# Patient Record
Sex: FEMALE | Race: WHITE | Employment: OTHER | URBAN - METROPOLITAN AREA
[De-identification: names, ages, dates, MRNs, and addresses within clinical notes are randomized per-mention and may not be internally consistent; named-entity substitution may affect disease eponyms.]

---

## 2017-02-20 ENCOUNTER — TELEPHONE (OUTPATIENT)
Dept: FAMILY MEDICINE CLINIC | Age: 77
End: 2017-02-20

## 2017-02-20 DIAGNOSIS — I10 ESSENTIAL HYPERTENSION: ICD-10-CM

## 2017-02-20 RX ORDER — DOXAZOSIN MESYLATE 4 MG/1
4 TABLET ORAL NIGHTLY
Qty: 30 TABLET | Refills: 1 | Status: SHIPPED | OUTPATIENT
Start: 2017-02-20 | End: 2017-04-17 | Stop reason: SDUPTHER

## 2017-02-20 RX ORDER — CARVEDILOL 25 MG/1
25 TABLET ORAL 2 TIMES DAILY
Qty: 60 TABLET | Refills: 1 | Status: SHIPPED | OUTPATIENT
Start: 2017-02-20 | End: 2017-04-17 | Stop reason: SDUPTHER

## 2017-04-17 ENCOUNTER — OFFICE VISIT (OUTPATIENT)
Dept: FAMILY MEDICINE CLINIC | Age: 77
End: 2017-04-17

## 2017-04-17 VITALS
BODY MASS INDEX: 27.79 KG/M2 | HEART RATE: 72 BPM | DIASTOLIC BLOOD PRESSURE: 60 MMHG | SYSTOLIC BLOOD PRESSURE: 112 MMHG | WEIGHT: 162.8 LBS | HEIGHT: 64 IN

## 2017-04-17 DIAGNOSIS — F41.9 ANXIETY: ICD-10-CM

## 2017-04-17 DIAGNOSIS — K59.00 CONSTIPATION, UNSPECIFIED CONSTIPATION TYPE: ICD-10-CM

## 2017-04-17 DIAGNOSIS — E11.9 TYPE 2 DIABETES MELLITUS WITHOUT COMPLICATION, WITHOUT LONG-TERM CURRENT USE OF INSULIN (HCC): Primary | ICD-10-CM

## 2017-04-17 DIAGNOSIS — E78.5 HYPERLIPIDEMIA, UNSPECIFIED HYPERLIPIDEMIA TYPE: ICD-10-CM

## 2017-04-17 DIAGNOSIS — L84 FOOT CALLUS: ICD-10-CM

## 2017-04-17 DIAGNOSIS — J44.9 COPD, SEVERE (HCC): ICD-10-CM

## 2017-04-17 DIAGNOSIS — I10 ESSENTIAL HYPERTENSION: ICD-10-CM

## 2017-04-17 DIAGNOSIS — R23.8 DUSKY FEET: ICD-10-CM

## 2017-04-17 LAB — HBA1C MFR BLD: 7.6 %

## 2017-04-17 PROCEDURE — 83036 HEMOGLOBIN GLYCOSYLATED A1C: CPT | Performed by: FAMILY MEDICINE

## 2017-04-17 PROCEDURE — 99214 OFFICE O/P EST MOD 30 MIN: CPT | Performed by: FAMILY MEDICINE

## 2017-04-17 RX ORDER — DOXAZOSIN MESYLATE 4 MG/1
4 TABLET ORAL NIGHTLY
Qty: 30 TABLET | Refills: 1 | Status: SHIPPED | OUTPATIENT
Start: 2017-04-17 | End: 2017-07-03 | Stop reason: SDUPTHER

## 2017-04-17 RX ORDER — POLYETHYLENE GLYCOL 3350 17 G/17G
17 POWDER, FOR SOLUTION ORAL DAILY
Qty: 510 G | Refills: 11 | Status: SHIPPED | OUTPATIENT
Start: 2017-04-17 | End: 2017-10-02

## 2017-04-17 RX ORDER — DOXYCYCLINE HYCLATE 100 MG
1 TABLET ORAL EVERY 12 HOURS
Refills: 0 | COMMUNITY
Start: 2017-02-09 | End: 2017-10-02

## 2017-04-17 RX ORDER — ATORVASTATIN CALCIUM 20 MG/1
20 TABLET, FILM COATED ORAL DAILY
Qty: 30 TABLET | Refills: 5 | Status: SHIPPED | OUTPATIENT
Start: 2017-04-17 | End: 2017-05-05 | Stop reason: SDUPTHER

## 2017-04-17 RX ORDER — UMECLIDINIUM BROMIDE AND VILANTEROL TRIFENATATE 62.5; 25 UG/1; UG/1
1 POWDER RESPIRATORY (INHALATION) DAILY
Refills: 6 | COMMUNITY
Start: 2017-03-24

## 2017-04-17 RX ORDER — LEVOTHYROXINE SODIUM 0.05 MG/1
50 TABLET ORAL DAILY
Qty: 30 TABLET | Refills: 11 | Status: SHIPPED | OUTPATIENT
Start: 2017-04-17 | End: 2018-03-29 | Stop reason: SDUPTHER

## 2017-04-17 RX ORDER — CARVEDILOL 25 MG/1
25 TABLET ORAL 2 TIMES DAILY
Qty: 60 TABLET | Refills: 1 | Status: SHIPPED | OUTPATIENT
Start: 2017-04-17 | End: 2017-07-03 | Stop reason: SDUPTHER

## 2017-05-05 DIAGNOSIS — E78.5 HYPERLIPIDEMIA, UNSPECIFIED HYPERLIPIDEMIA TYPE: ICD-10-CM

## 2017-05-05 RX ORDER — ATORVASTATIN CALCIUM 20 MG/1
20 TABLET, FILM COATED ORAL DAILY
Qty: 90 TABLET | Refills: 1 | Status: SHIPPED | OUTPATIENT
Start: 2017-05-05 | End: 2017-10-02 | Stop reason: SDUPTHER

## 2017-07-03 ENCOUNTER — TELEPHONE (OUTPATIENT)
Dept: FAMILY MEDICINE CLINIC | Age: 77
End: 2017-07-03

## 2017-07-03 DIAGNOSIS — I10 ESSENTIAL HYPERTENSION: ICD-10-CM

## 2017-07-03 RX ORDER — DOXAZOSIN MESYLATE 4 MG/1
4 TABLET ORAL NIGHTLY
Qty: 90 TABLET | Refills: 3 | Status: SHIPPED | OUTPATIENT
Start: 2017-07-03 | End: 2017-08-23

## 2017-07-03 RX ORDER — CARVEDILOL 25 MG/1
25 TABLET ORAL 2 TIMES DAILY
Qty: 180 TABLET | Refills: 3 | Status: SHIPPED | OUTPATIENT
Start: 2017-07-03 | End: 2017-10-02 | Stop reason: DRUGHIGH

## 2017-08-21 ENCOUNTER — OFFICE VISIT (OUTPATIENT)
Dept: FAMILY MEDICINE CLINIC | Age: 77
End: 2017-08-21

## 2017-08-21 VITALS
HEART RATE: 99 BPM | OXYGEN SATURATION: 96 % | BODY MASS INDEX: 26.81 KG/M2 | DIASTOLIC BLOOD PRESSURE: 50 MMHG | WEIGHT: 155 LBS | SYSTOLIC BLOOD PRESSURE: 98 MMHG

## 2017-08-21 DIAGNOSIS — I95.1 ORTHOSTATIC HYPOTENSION: ICD-10-CM

## 2017-08-21 DIAGNOSIS — R42 DIZZINESS: Primary | ICD-10-CM

## 2017-08-21 DIAGNOSIS — Z91.81 AT HIGH RISK FOR FALLS: ICD-10-CM

## 2017-08-21 DIAGNOSIS — R23.1 PALLOR: ICD-10-CM

## 2017-08-21 DIAGNOSIS — I51.7 LEFT ATRIAL ENLARGEMENT: ICD-10-CM

## 2017-08-21 LAB
BASOPHILS ABSOLUTE: 0 K/UL (ref 0–0.2)
BASOPHILS RELATIVE PERCENT: 0.4 %
EOSINOPHILS ABSOLUTE: 0 K/UL (ref 0–0.6)
EOSINOPHILS RELATIVE PERCENT: 0.2 %
HCT VFR BLD CALC: 38.3 % (ref 36–48)
HEMOGLOBIN: 12.6 G/DL (ref 12–16)
LIPASE: 189 UNITS/L
LYMPHOCYTES ABSOLUTE: 1.1 K/UL (ref 1–5.1)
LYMPHOCYTES RELATIVE PERCENT: 8.7 %
MCH RBC QN AUTO: 30 PG (ref 26–34)
MCHC RBC AUTO-ENTMCNC: 32.8 G/DL (ref 31–36)
MCV RBC AUTO: 91.6 FL (ref 80–100)
MONOCYTES ABSOLUTE: 0.5 K/UL (ref 0–1.3)
MONOCYTES RELATIVE PERCENT: 3.8 %
NEUTROPHILS ABSOLUTE: 11.1 K/UL (ref 1.7–7.7)
NEUTROPHILS RELATIVE PERCENT: 86.9 %
PDW BLD-RTO: 14.2 % (ref 12.4–15.4)
PLATELET # BLD: 306 K/UL (ref 135–450)
PMV BLD AUTO: 8.2 FL (ref 5–10.5)
RBC # BLD: 4.18 M/UL (ref 4–5.2)
WBC # BLD: 12.8 K/UL (ref 4–11)

## 2017-08-21 PROCEDURE — 93000 ELECTROCARDIOGRAM COMPLETE: CPT | Performed by: FAMILY MEDICINE

## 2017-08-21 PROCEDURE — 36415 COLL VENOUS BLD VENIPUNCTURE: CPT | Performed by: FAMILY MEDICINE

## 2017-08-21 PROCEDURE — 99214 OFFICE O/P EST MOD 30 MIN: CPT | Performed by: FAMILY MEDICINE

## 2017-08-22 LAB
ANION GAP SERPL CALCULATED.3IONS-SCNC: 16 MMOL/L (ref 3–16)
BUN BLDV-MCNC: 18 MG/DL (ref 7–20)
CALCIUM SERPL-MCNC: 9.8 MG/DL (ref 8.3–10.6)
CHLORIDE BLD-SCNC: 95 MMOL/L (ref 99–110)
CO2: 29 MMOL/L (ref 21–32)
CREAT SERPL-MCNC: 0.9 MG/DL (ref 0.6–1.2)
GFR AFRICAN AMERICAN: >60
GFR NON-AFRICAN AMERICAN: >60
GLUCOSE BLD-MCNC: 170 MG/DL (ref 70–99)
POTASSIUM SERPL-SCNC: 4.4 MMOL/L (ref 3.5–5.1)
SODIUM BLD-SCNC: 140 MMOL/L (ref 136–145)

## 2017-08-23 ENCOUNTER — OFFICE VISIT (OUTPATIENT)
Dept: FAMILY MEDICINE CLINIC | Age: 77
End: 2017-08-23

## 2017-08-23 VITALS
OXYGEN SATURATION: 93 % | SYSTOLIC BLOOD PRESSURE: 118 MMHG | WEIGHT: 155.2 LBS | DIASTOLIC BLOOD PRESSURE: 70 MMHG | RESPIRATION RATE: 16 BRPM | HEART RATE: 90 BPM | BODY MASS INDEX: 26.85 KG/M2

## 2017-08-23 DIAGNOSIS — R42 DIZZINESS: ICD-10-CM

## 2017-08-23 DIAGNOSIS — I95.1 ORTHOSTATIC HYPOTENSION: Primary | ICD-10-CM

## 2017-08-23 PROCEDURE — 99213 OFFICE O/P EST LOW 20 MIN: CPT | Performed by: FAMILY MEDICINE

## 2017-10-02 ENCOUNTER — OFFICE VISIT (OUTPATIENT)
Dept: FAMILY MEDICINE CLINIC | Age: 77
End: 2017-10-02

## 2017-10-02 VITALS
WEIGHT: 161.4 LBS | SYSTOLIC BLOOD PRESSURE: 130 MMHG | HEIGHT: 64 IN | HEART RATE: 80 BPM | BODY MASS INDEX: 27.55 KG/M2 | DIASTOLIC BLOOD PRESSURE: 76 MMHG

## 2017-10-02 DIAGNOSIS — E78.5 HYPERLIPIDEMIA, UNSPECIFIED HYPERLIPIDEMIA TYPE: ICD-10-CM

## 2017-10-02 DIAGNOSIS — J44.9 COPD, SEVERE (HCC): ICD-10-CM

## 2017-10-02 DIAGNOSIS — E11.9 TYPE 2 DIABETES MELLITUS WITHOUT COMPLICATION, WITHOUT LONG-TERM CURRENT USE OF INSULIN (HCC): Primary | ICD-10-CM

## 2017-10-02 DIAGNOSIS — R09.89 DIMINISHED PULSES IN LOWER EXTREMITY: ICD-10-CM

## 2017-10-02 DIAGNOSIS — I10 ESSENTIAL HYPERTENSION: ICD-10-CM

## 2017-10-02 DIAGNOSIS — L91.8 INFLAMED SKIN TAG: ICD-10-CM

## 2017-10-02 LAB — HBA1C MFR BLD: 7.7 %

## 2017-10-02 PROCEDURE — 11200 RMVL SKIN TAGS UP TO&INC 15: CPT | Performed by: FAMILY MEDICINE

## 2017-10-02 PROCEDURE — 83036 HEMOGLOBIN GLYCOSYLATED A1C: CPT | Performed by: FAMILY MEDICINE

## 2017-10-02 PROCEDURE — 99214 OFFICE O/P EST MOD 30 MIN: CPT | Performed by: FAMILY MEDICINE

## 2017-10-02 RX ORDER — MULTIVITAMIN WITH IRON
250 TABLET ORAL DAILY
COMMUNITY

## 2017-10-02 RX ORDER — CARVEDILOL 12.5 MG/1
1 TABLET ORAL 2 TIMES DAILY
Refills: 0 | COMMUNITY
Start: 2017-09-10 | End: 2017-10-02 | Stop reason: SDUPTHER

## 2017-10-02 RX ORDER — ATORVASTATIN CALCIUM 20 MG/1
20 TABLET, FILM COATED ORAL DAILY
Qty: 90 TABLET | Refills: 1 | Status: SHIPPED | OUTPATIENT
Start: 2017-10-02 | End: 2018-03-29 | Stop reason: SDUPTHER

## 2017-10-02 RX ORDER — PANTOPRAZOLE SODIUM 40 MG/1
40 TABLET, DELAYED RELEASE ORAL 2 TIMES DAILY
COMMUNITY
Start: 2017-09-10 | End: 2019-03-28

## 2017-10-02 RX ORDER — BLOOD-GLUCOSE METER
1 KIT MISCELLANEOUS DAILY
Qty: 1 KIT | Refills: 0 | Status: SHIPPED | OUTPATIENT
Start: 2017-10-02

## 2017-10-02 RX ORDER — CARVEDILOL 12.5 MG/1
12.5 TABLET ORAL 2 TIMES DAILY
Qty: 180 TABLET | Refills: 1 | Status: SHIPPED | OUTPATIENT
Start: 2017-10-02 | End: 2017-11-13 | Stop reason: DRUGHIGH

## 2017-10-02 RX ORDER — OMEGA-3 FATTY ACIDS/FISH OIL 300-1000MG
1 CAPSULE ORAL DAILY
COMMUNITY

## 2017-10-02 RX ORDER — ALBUTEROL SULFATE 2.5 MG/3ML
3 SOLUTION RESPIRATORY (INHALATION) 4 TIMES DAILY PRN
Refills: 12 | COMMUNITY
Start: 2017-08-12

## 2017-10-02 ASSESSMENT — ENCOUNTER SYMPTOMS
SHORTNESS OF BREATH: 0
ABDOMINAL PAIN: 1
CHEST TIGHTNESS: 0

## 2017-10-02 NOTE — PROGRESS NOTES
BP Readings from Last 2 Encounters:   10/02/17 130/76   08/23/17 118/70     Hemoglobin A1C (%)   Date Value   04/17/2017 7.6     MICROALBUMIN, RANDOM URINE (mg/dL)   Date Value   11/29/2016 <1.20     LDL Calculated (mg/dL)   Date Value   05/18/2016 74              Tobacco use:  Patient  reports that she quit smoking about 18 years ago. She has never used smokeless tobacco.    If Smoker - Cessation materials given? No    Is Daily aspirin on medication list?:  No    Diabetic retinal exam done? No   If yes, document in Health Maintenance. Monofilament placed on counter? Yes    Shoes and socks removed? Yes    BP taken with correct size cuff? Yes   Repeated if > 140/90 NA     Is patient taking any medications for diabetes    No   If yes, see medication list    Is the patient reporting any side effects of diabetic medications? No    Microalbumin performed if applicable?   Yes      Is patient taking any over the counter medications    No   If yes, see medication list
well-developed and well-nourished. No distress. Smells of tobacco   HENT:   Head: Normocephalic. Neck: No tracheal deviation present. No thyromegaly present. Cardiovascular: Normal rate, regular rhythm, S1 normal, S2 normal, normal heart sounds and intact distal pulses. Exam reveals no gallop and no friction rub. No murmur heard. Pulses:       Dorsalis pedis pulses are 1+ on the right side, and 1+ on the left side. Posterior tibial pulses are 1+ on the right side, and 1+ on the left side. Pulmonary/Chest: Effort normal and breath sounds normal. No respiratory distress. She has no wheezes. She has no rales. Abdominal: Soft. Normal aorta. She exhibits no distension and no mass. There is no tenderness. Musculoskeletal: She exhibits no edema. Neurological: She is alert and oriented to person, place, and time. Intact monofilament test both feet     Skin: Skin is warm and dry. Feet no calluses, no lesions   Psychiatric: She has a normal mood and affect. Vitals:    10/02/17 0951   BP: 130/76   Pulse: 80   Weight: 161 lb 6.4 oz (73.2 kg)   Height: 5' 3.75\" (1.619 m)     Body mass index is 27.92 kg/(m^2). Wt Readings from Last 3 Encounters:   10/02/17 161 lb 6.4 oz (73.2 kg)   08/23/17 155 lb 3.2 oz (70.4 kg)   08/21/17 155 lb (70.3 kg)     BP Readings from Last 3 Encounters:   10/02/17 130/76   08/23/17 118/70   08/21/17 (!) 98/50          Assessment:      1. Type 2 diabetes mellitus without complication, without long-term current use of insulin ([facility])  POCT glycosylated hemoglobin (Hb A1C)    glucose monitoring kit (FREESTYLE) monitoring kit   2. Essential hypertension  carvedilol (COREG) 12.5 MG tablet   3. COPD, severe ([address]     4. Hyperlipidemia, unspecified hyperlipidemia type  atorvastatin (LIPITOR) 20 MG tablet   5. Inflamed skin tag  95388 - [place] REMOVAL OF SKIN TAGS, UP TO 15   6.  Diminished pulses in lower extremity             Plan:      Diabetes stable continue current

## 2017-10-02 NOTE — MR AVS SNAPSHOT
· Choose healthy (unsaturated) fats, such as nuts, olive oil, and certain nut or seed oils like canola, soybean, and flaxseed. · Limit unhealthy (saturated) fats, such as butter, palm oil, and coconut oil. And limit fats found in animal products, such as meat and dairy products made with whole milk. Try to eat red meat only a few times a month in very small amounts. · Limit sweets and desserts to only a few times a week. This includes sugar-sweetened drinks like soda. The Mediterranean diet may also include red wine with your meal1 glass each day for women and up to 2 glasses a day for men. Tips for eating at home  · Use herbs, spices, garlic, lemon zest, and citrus juice instead of salt to add flavor to foods. · Add avocado slices to your sandwich instead of becerra. · Have fish for lunch or dinner instead of red meat. Brush the fish with olive oil, and broil or grill it. · Sprinkle your salad with seeds or nuts instead of cheese. · Cook with olive or canola oil instead of butter or oils that are high in saturated fat. · Switch from 2% milk or whole milk to 1% or fat-free milk. · Dip raw vegetables in a vinaigrette dressing or hummus instead of dips made from mayonnaise or sour cream.  · Have a piece of fruit for dessert instead of a piece of cake. Try baked apples, or have some dried fruit. Tips for eating out  · Try broiled, grilled, baked, or poached fish instead of having it fried or breaded. · Ask your  to have your meals prepared with olive oil instead of butter. · Order dishes made with marinara sauce or sauces made from olive oil. Avoid sauces made from cream or mayonnaise. · Choose whole-grain breads, whole wheat pasta and pizza crust, brown rice, beans, and lentils. · Cut back on butter or margarine on bread. Instead, you can dip your bread in a small amount of olive oil. · Ask for a side salad or grilled vegetables instead of french fries or chips. Where can you learn more? Go to https://chpepiceweb.healthQuantuModeling. org and sign in to your Lex account. Enter [phone] in the [facility] box to learn more about \"Learning About the Mediterranean Diet. \"     If you do not have an account, please click on the \"Sign Up Now\" link. Current as of: December 29, 2016  Content Version: 11.3  © 0861-9640 [employer]. Care instructions adapted under license by [facility] ([facility]). If you have questions about a medical condition or this instruction, always ask your healthcare professional. [place] [address] any warranty or liability for your use of this information. Today's Medication Changes          These changes are accurate as of: 10/2/17 10:44 AM.  If you have any questions, ask your nurse or doctor.                START taking these medications           glucose monitoring kit monitoring kit   Instructions:  1 kit by Does not apply route daily Dispense whatever brand is covered by insurance   Quantity:  1 kit   Refills:  0   Started by:  Cassandra Belle MD         STOP taking these medications           doxycycline hyclate 100 MG tablet   Commonly known as:  VIBRA-TABS   Stopped by:  Cassandra Belle MD       polyethylene glycol powder   Commonly known as:  Loreli Aurora by:  Cassandra Belle MD       triamterene-hydrochlorothiazide 37.5-25 MG per tablet   Commonly known as:  MAXZIDE-25   Stopped by:  Cassandra Belle MD            Where to Get Your Medications      These medications were sent to [address] [place] [address] [phone] Jimi Caba [phone]  [address] [place] [id number]     Phone:  [phone]     atorvastatin 20 MG tablet    glucose monitoring kit monitoring kit         You can get these medications from any pharmacy     Bring a paper prescription for each of these medications     carvedilol 12.5 MG tablet               Your Current Medications Are albuterol (PROVENTIL) (2.5 MG/3ML) 0.083% nebulizer solution 3 mLs 4 times daily as needed     Roflumilast (DALIRESP) 500 MCG tablet Take 500 mcg by mouth daily    magnesium (MAGNESIUM-OXIDE) 250 MG TABS tablet Take 250 mg by mouth daily    Omega 3 1000 MG CAPS Take 1 capsule by mouth 2 times daily    pantoprazole (PROTONIX) 40 MG tablet Take 40 mg by mouth 2 times daily    Probiotic Product (PROBIOTIC COLON SUPPORT PO) Take 2 tablets by mouth daily    carvedilol (COREG) 12.5 MG tablet Take 1 tablet by mouth 2 times daily    atorvastatin (LIPITOR) 20 MG tablet Take 1 tablet by mouth daily    glucose monitoring kit (FREESTYLE) monitoring kit 1 kit by Does not apply route daily Dispense whatever brand is covered by insurance    ANORO ELLIPTA 62.5-25 MCG/INH AEPB inhaler Inhale 1 puff into the lungs daily    levothyroxine (SYNTHROID) 50 MCG tablet Take 1 tablet by mouth daily As directed    sertraline (ZOLOFT) 50 MG tablet Take 1 tablet by mouth daily    predniSONE (DELTASONE) 10 MG tablet Take 1 tablet by mouth    Melatonin 5 MG TABS tablet Place 2 tablets under the tongue nightly as needed    glucose blood VI test strips (ONE TOUCH ULTRA TEST) strip Twice daily    ipratropium-albuterol (DUONEB) 0.5-2.5 (3) MG/3ML SOLN nebulizer solution Inhale 1 vial into the lungs 3 times daily      Allergies              Cipro Xr Nausea Only    Imdur [Isosorbide Dinitrate]     Lipitor     Lisinopril Swelling    Lip swelling    Norvasc [Amlodipine Besylate]     Sulfamethoxazole-trimethoprim Nausea Only    Zocor [Simvastatin]     Amlodipine Palpitations    Procardia Xl [Nifedipine] Palpitations      We Ordered/Performed the following           POCT glycosylated hemoglobin (Hb A1C)          Result Summary for POCT glycosylated hemoglobin (Hb A1C)      Result Information     Status          Final result (Collected: 10/2/2017 10:13 AM)           10/2/2017 10:14 AM      Component Results     Component Value Ref Range & Units Status

## 2017-10-02 NOTE — PATIENT INSTRUCTIONS
Learning About the [facility] Diet  What is the Mediterranean diet? The Mediterranean diet is a style of eating rather than a diet plan. It features foods eaten in [place], [place], [place], and other countries along the [facility]. It emphasizes eating foods like fish, fruits, vegetables, beans, high-fiber breads and whole grains, nuts, and olive oil. This style of eating includes limited red meat, cheese, and sweets. Why choose the Mediterranean diet? A Mediterranean-style diet may improve heart health. It contains more fat than other heart-healthy diets. But the fats are mainly from nuts, unsaturated oils (such as fish oils and olive oil), and certain nut or seed oils (such as canola, soybean, or flaxseed oil). These fats may help protect the heart and blood vessels. How can you get started on the Mediterranean diet? Here are some things you can do to switch to a more Mediterranean way of eating. What to eat  · Eat a variety of fruits and vegetables each day, such as grapes, blueberries, tomatoes, broccoli, peppers, figs, olives, spinach, eggplant, beans, lentils, and chickpeas. · Eat a variety of whole-grain foods each day, such as oats, brown rice, and whole wheat bread, pasta, and couscous. · Eat fish at least 2 times a week. Try tuna, salmon, mackerel, lake trout, herring, or sardines. · Eat moderate amounts of low-fat dairy products, such as milk, cheese, or yogurt. · Eat moderate amounts of poultry and eggs. · Choose healthy (unsaturated) fats, such as nuts, olive oil, and certain nut or seed oils like canola, soybean, and flaxseed. · Limit unhealthy (saturated) fats, such as butter, palm oil, and coconut oil. And limit fats found in animal products, such as meat and dairy products made with whole milk. Try to eat red meat only a few times a month in very small amounts. · Limit sweets and desserts to only a few times a week.  This includes sugar-sweetened drinks like soda. The Mediterranean diet may also include red wine with your meal1 glass each day for women and up to 2 glasses a day for men. Tips for eating at home  · Use herbs, spices, garlic, lemon zest, and citrus juice instead of salt to add flavor to foods. · Add avocado slices to your sandwich instead of becerra. · Have fish for lunch or dinner instead of red meat. Brush the fish with olive oil, and broil or grill it. · Sprinkle your salad with seeds or nuts instead of cheese. · Cook with olive or canola oil instead of butter or oils that are high in saturated fat. · Switch from 2% milk or whole milk to 1% or fat-free milk. · Dip raw vegetables in a vinaigrette dressing or hummus instead of dips made from mayonnaise or sour cream.  · Have a piece of fruit for dessert instead of a piece of cake. Try baked apples, or have some dried fruit. Tips for eating out  · Try broiled, grilled, baked, or poached fish instead of having it fried or breaded. · Ask your  to have your meals prepared with olive oil instead of butter. · Order dishes made with marinara sauce or sauces made from olive oil. Avoid sauces made from cream or mayonnaise. · Choose whole-grain breads, whole wheat pasta and pizza crust, brown rice, beans, and lentils. · Cut back on butter or margarine on bread. Instead, you can dip your bread in a small amount of olive oil. · Ask for a side salad or grilled vegetables instead of french fries or chips. Where can you learn more? Go to https://Zolatamekaeb.Yones. org and sign in to your [employer] account. Enter [phone] in the [facility] box to learn more about \"Learning About the Mediterranean Diet. \"     If you do not have an account, please click on the \"Sign Up Now\" link. Current as of: December 29, 2016  Content Version: 11.3  © 8955-2075 [employer]. Care instructions adapted under license by [facility] ([facility]).  If you have questions about a medical condition or

## 2017-11-10 ENCOUNTER — OFFICE VISIT (OUTPATIENT)
Dept: FAMILY MEDICINE CLINIC | Age: 77
End: 2017-11-10

## 2017-11-10 VITALS
WEIGHT: 162.4 LBS | DIASTOLIC BLOOD PRESSURE: 80 MMHG | HEART RATE: 58 BPM | SYSTOLIC BLOOD PRESSURE: 160 MMHG | HEIGHT: 64 IN | BODY MASS INDEX: 27.72 KG/M2

## 2017-11-10 DIAGNOSIS — I10 ESSENTIAL HYPERTENSION: Primary | ICD-10-CM

## 2017-11-10 PROCEDURE — 99212 OFFICE O/P EST SF 10 MIN: CPT | Performed by: FAMILY MEDICINE

## 2017-11-10 RX ORDER — DOXAZOSIN MESYLATE 4 MG/1
2 TABLET ORAL DAILY
COMMUNITY
Start: 2017-09-10 | End: 2018-01-11 | Stop reason: ALTCHOICE

## 2017-11-10 RX ORDER — CARVEDILOL 25 MG/1
25 TABLET ORAL 2 TIMES DAILY
Qty: 180 TABLET | Refills: 3 | Status: SHIPPED | OUTPATIENT
Start: 2017-11-10 | End: 2018-03-29 | Stop reason: SDUPTHER

## 2017-11-13 ENCOUNTER — TELEPHONE (OUTPATIENT)
Dept: FAMILY MEDICINE CLINIC | Age: 77
End: 2017-11-13

## 2017-11-13 RX ORDER — TRIAMTERENE AND HYDROCHLOROTHIAZIDE 37.5; 25 MG/1; MG/1
TABLET ORAL
Refills: 11 | COMMUNITY
Start: 2017-09-30 | End: 2017-12-28 | Stop reason: SDUPTHER

## 2017-11-13 NOTE — TELEPHONE ENCOUNTER
Patient called blood pressure elevated Coreg was increased to 25 mg twice a day on 11/10/17      Patient Blood Pressure readings after dose increase   180/80, 170/ 160/ 180/  Patient did not get bottom numbers or pulse. Patient will start doing whole blood pressure and pulse. Is there anything to do at this time.

## 2017-11-16 ENCOUNTER — TELEPHONE (OUTPATIENT)
Dept: FAMILY MEDICINE CLINIC | Age: 77
End: 2017-11-16

## 2017-11-16 DIAGNOSIS — I10 ESSENTIAL HYPERTENSION: Primary | ICD-10-CM

## 2017-11-16 RX ORDER — VALSARTAN 80 MG/1
80 TABLET ORAL DAILY
Qty: 30 TABLET | Refills: 0 | Status: SHIPPED | OUTPATIENT
Start: 2017-11-16 | End: 2017-11-21 | Stop reason: DRUGHIGH

## 2017-11-16 NOTE — TELEPHONE ENCOUNTER
Patient called in with blood pressure readings: On Monday she took it 4 times:  170/81 pulse 64  189/81 pulse 59  179/82 pulse 61  193/90 pulse 59    On Tuesday she took it 4 times: In the morning before her medication 195/96 pulse 56  At 10 am 157/80 pulse 56  At 3:23 pm 131/53 pulse 70  At 11:20 pm 174/76 pulse 60 after medication    On Wednesday she took it 4 times: In the morning before her medication 162/91 pulse 62  At 11:00 am 130/58 pulse 61  At 2:00 pm 159/58 pulse 66  At 9:30 pm 176/79 pulse 66 after medication    Today she took it 3 times: In the morning before her medication 213/96 pulse 59  She took it a few minutes later 205/102 pulse 58  And a little bit ago 174/49 pulse 69.     She takes her evening medications between 9-11 pm.

## 2017-11-16 NOTE — TELEPHONE ENCOUNTER
Additional medications sent to pharmacy. Call back with blood pressure readings on Monday. No need to give us the pulse rate.

## 2017-11-20 ENCOUNTER — TELEPHONE (OUTPATIENT)
Dept: FAMILY MEDICINE CLINIC | Age: 77
End: 2017-11-20

## 2017-11-20 NOTE — TELEPHONE ENCOUNTER
Blood pressure readings since starting Valsartan 80 m17:  169/82, 162/74, 173/67; 17:  179/85, 177/75, 136/59; and on 17:  200/96 before med, 171/82, 191/79, 190/83, 178/74;  17:  172/91 before med, 180/85 10 minutes later.

## 2017-11-21 ENCOUNTER — OFFICE VISIT (OUTPATIENT)
Dept: FAMILY MEDICINE CLINIC | Age: 77
End: 2017-11-21

## 2017-11-21 VITALS
HEIGHT: 64 IN | BODY MASS INDEX: 27.59 KG/M2 | SYSTOLIC BLOOD PRESSURE: 120 MMHG | WEIGHT: 161.6 LBS | HEART RATE: 66 BPM | DIASTOLIC BLOOD PRESSURE: 64 MMHG

## 2017-11-21 DIAGNOSIS — I10 ESSENTIAL HYPERTENSION: Primary | ICD-10-CM

## 2017-11-21 PROCEDURE — 99213 OFFICE O/P EST LOW 20 MIN: CPT | Performed by: FAMILY MEDICINE

## 2017-11-21 RX ORDER — VALSARTAN 160 MG/1
160 TABLET ORAL DAILY
Qty: 90 TABLET | Refills: 3 | Status: SHIPPED | OUTPATIENT
Start: 2017-11-21 | End: 2018-01-11 | Stop reason: DRUGHIGH

## 2017-11-22 NOTE — PROGRESS NOTES
Subjective:      Patient ID: Amadna Basurto is a 69 y.o. female. Hypertension   This is a chronic problem. The current episode started more than 1 year ago. The problem has been waxing and waning since onset. Condition status: BP was running high. Up to 859 systolic.  doubled the Diovan. BP has come down slightly. Pertinent negatives include no palpitations. Risk factors for coronary artery disease include diabetes mellitus, dyslipidemia and post-menopausal state. Past treatments include diuretics, beta blockers and angiotensin blockers. Patient Active Problem List   Diagnosis    Hyperlipidemia    Raynaud's disease    Hypothyroidism    Constipation    Diabetes mellitus, type 2 ([address]    Osteopenia    COPD, severe ([address]    Essential hypertension    Dusky feet     Past Surgical History:   Procedure Laterality Date    CARDIOVASCULAR STRESS TEST  11/2014    negative    COLONOSCOPY  10/20/2011    diverticulosis, rectal polyp,internal hemorrhoids; repeat colonoscopy in 2016   [address]  2004    TOE DEBRIDEMENT  8/2011    Right foot, fourth toe infection - S/P I & D - Dr Ronda Piper ENDOSCOPY  08/24/2017           Review of Systems   Cardiovascular: Negative for palpitations. Objective:   Physical Exam   Constitutional: She appears well-developed and well-nourished. No distress. Psychiatric: She has a normal mood and affect. Her speech is normal and behavior is normal. Thought content normal.          Assessment:      1. Essential hypertension  valsartan (DIOVAN) 160 MG tablet           Plan:      BP better now. If her blood pressure runs high again, she is to wait 15 minutes and rechecked the blood pressure. If her systolic goes above 885 while on her cruise, she should take an extra 80 mg Diovan. Let me know how it goes when she comes back.     I suspect her blood pressures been going up recently due to not being ill anymore with pancreatitis.

## 2017-12-27 ENCOUNTER — TELEPHONE (OUTPATIENT)
Dept: FAMILY MEDICINE CLINIC | Age: 77
End: 2017-12-27

## 2017-12-27 NOTE — TELEPHONE ENCOUNTER
Patient called asking for a refill on : triamterene-hydrochlorothiazide (MAXZIDE-25) 37.5-25 MG per tablet    1 tablet daily. It looks like in September this medication was reconciled from outside and should have 11 refills. I called [address] and was told this prescription  on 17 so she will need a new prescription sent in. Please send as Dr. Sallie Severance is out.

## 2017-12-28 RX ORDER — TRIAMTERENE AND HYDROCHLOROTHIAZIDE 37.5; 25 MG/1; MG/1
TABLET ORAL
Qty: 90 TABLET | Refills: 3 | Status: SHIPPED | OUTPATIENT
Start: 2017-12-28 | End: 2018-02-21 | Stop reason: ALTCHOICE

## 2018-01-04 LAB
LEFT VENTRICULAR EJECTION FRACTION MODE: NORMAL
LV EF: NORMAL %

## 2018-01-11 ENCOUNTER — OFFICE VISIT (OUTPATIENT)
Dept: FAMILY MEDICINE CLINIC | Age: 78
End: 2018-01-11

## 2018-01-11 VITALS
HEIGHT: 64 IN | WEIGHT: 163.2 LBS | BODY MASS INDEX: 27.86 KG/M2 | SYSTOLIC BLOOD PRESSURE: 120 MMHG | DIASTOLIC BLOOD PRESSURE: 58 MMHG | HEART RATE: 57 BPM

## 2018-01-11 DIAGNOSIS — J44.9 COPD, SEVERE (HCC): ICD-10-CM

## 2018-01-11 DIAGNOSIS — E11.9 TYPE 2 DIABETES MELLITUS WITHOUT COMPLICATION, WITHOUT LONG-TERM CURRENT USE OF INSULIN (HCC): Primary | ICD-10-CM

## 2018-01-11 DIAGNOSIS — E03.9 HYPOTHYROIDISM, UNSPECIFIED TYPE: ICD-10-CM

## 2018-01-11 DIAGNOSIS — I10 ESSENTIAL HYPERTENSION: ICD-10-CM

## 2018-01-11 LAB — HBA1C MFR BLD: 8 %

## 2018-01-11 PROCEDURE — 83036 HEMOGLOBIN GLYCOSYLATED A1C: CPT | Performed by: FAMILY MEDICINE

## 2018-01-11 PROCEDURE — 99214 OFFICE O/P EST MOD 30 MIN: CPT | Performed by: FAMILY MEDICINE

## 2018-01-11 ASSESSMENT — ENCOUNTER SYMPTOMS: SHORTNESS OF BREATH: 0

## 2018-01-11 ASSESSMENT — COPD QUESTIONNAIRES: COPD: 1

## 2018-01-11 NOTE — PROGRESS NOTES
Medication Sig Dispense Refill    triamterene-hydrochlorothiazide (MAXZIDE-25) 37.5-25 MG per tablet TAKE 1 TABLET BY MOUTH EVERY DAY 90 tablet 3    carvedilol (COREG) 25 MG tablet Take 1 tablet by mouth 2 times daily 180 tablet 3    albuterol (PROVENTIL) (2.5 MG/3ML) 0.083% nebulizer solution 3 mLs 4 times daily as needed   12    magnesium (MAGNESIUM-OXIDE) 250 MG TABS tablet Take 250 mg by mouth daily      Omega 3 1000 MG CAPS Take 1 capsule by mouth 2 times daily      pantoprazole (PROTONIX) 40 MG tablet Take 40 mg by mouth 2 times daily      Probiotic Product (PROBIOTIC COLON SUPPORT PO) Take 2 tablets by mouth daily      atorvastatin (LIPITOR) 20 MG tablet Take 1 tablet by mouth daily 90 tablet 1    glucose monitoring kit (FREESTYLE) monitoring kit 1 kit by Does not apply route daily Dispense whatever brand is covered by insurance 1 kit 0    ANORO ELLIPTA 62.5-25 MCG/INH AEPB inhaler Inhale 1 puff into the lungs daily  6    glucose blood VI test strips (ONE TOUCH ULTRA TEST) strip Twice daily 100 each 11    levothyroxine (SYNTHROID) 50 MCG tablet Take 1 tablet by mouth daily As directed 30 tablet 11    sertraline (ZOLOFT) 50 MG tablet Take 1 tablet by mouth daily 90 tablet 3    predniSONE (DELTASONE) 10 MG tablet Take 1 tablet by mouth      ipratropium-albuterol (DUONEB) 0.5-2.5 (3) MG/3ML SOLN nebulizer solution Inhale 1 vial into the lungs 3 times daily  5    Melatonin 5 MG TABS tablet Place 2 tablets under the tongue nightly as needed       No current facility-administered medications on file prior to visit. Objective:   Physical Exam   Constitutional: She appears well-developed and well-nourished. HENT:   Head: Normocephalic and atraumatic. Neck: Neck supple. Cardiovascular: Normal rate, regular rhythm and normal heart sounds. Pulmonary/Chest: Effort normal. No respiratory distress. She has decreased breath sounds. She has no wheezes.    Neurological: She is

## 2018-02-21 ENCOUNTER — HOSPITAL ENCOUNTER (OUTPATIENT)
Dept: OTHER | Age: 78
Discharge: HOME | End: 2018-02-21
Attending: FAMILY MEDICINE | Admitting: FAMILY MEDICINE

## 2018-02-21 ENCOUNTER — OFFICE VISIT (OUTPATIENT)
Dept: FAMILY MEDICINE CLINIC | Age: 78
End: 2018-02-21

## 2018-02-21 VITALS
WEIGHT: 166.2 LBS | DIASTOLIC BLOOD PRESSURE: 70 MMHG | SYSTOLIC BLOOD PRESSURE: 140 MMHG | HEIGHT: 64 IN | TEMPERATURE: 97.9 F | HEART RATE: 94 BPM | BODY MASS INDEX: 28.38 KG/M2

## 2018-02-21 DIAGNOSIS — R23.3 EASY BRUISING: ICD-10-CM

## 2018-02-21 DIAGNOSIS — I10 ESSENTIAL HYPERTENSION: Primary | ICD-10-CM

## 2018-02-21 LAB
APTT: 25.8 SECONDS (ref 21.2–33)
BASOPHILS ABSOLUTE: 0 K/CU MM
BASOPHILS RELATIVE PERCENT: 0.3 % (ref 0–1)
DIFFERENTIAL TYPE: ABNORMAL
EOSINOPHILS ABSOLUTE: 0.1 K/CU MM
EOSINOPHILS RELATIVE PERCENT: 0.7 % (ref 0–3)
HCT VFR BLD CALC: 39 % (ref 37–47)
HEMOGLOBIN: 12.4 GM/DL (ref 12.5–16)
IMMATURE NEUTROPHIL %: 0.4 % (ref 0–0.43)
INR BLD: 0.92 INDEX
LYMPHOCYTES ABSOLUTE: 1.3 K/CU MM
LYMPHOCYTES RELATIVE PERCENT: 10 % (ref 24–44)
MCH RBC QN AUTO: 29.7 PG (ref 27–31)
MCHC RBC AUTO-ENTMCNC: 31.8 % (ref 32–36)
MCV RBC AUTO: 93.5 FL (ref 78–100)
MONOCYTES ABSOLUTE: 0.4 K/CU MM
MONOCYTES RELATIVE PERCENT: 3.3 % (ref 0–4)
NUCLEATED RBC %: 0 %
PDW BLD-RTO: 15.9 % (ref 11.7–14.9)
PLATELET # BLD: 277 K/CU MM (ref 140–440)
PMV BLD AUTO: 10.1 FL (ref 7.5–11.1)
PROTHROMBIN TIME: 10.7 SECONDS
RBC # BLD: 4.17 M/CU MM (ref 4.2–5.4)
SEGMENTED NEUTROPHILS ABSOLUTE COUNT: 11.4 K/CU MM
SEGMENTED NEUTROPHILS RELATIVE PERCENT: 85.3 % (ref 36–66)
TOTAL IMMATURE NEUTOROPHIL: 0.06 K/CU MM
TOTAL NUCLEATED RBC: 0 K/CU MM
WBC # BLD: 13.4 K/CU MM (ref 4–10.5)

## 2018-02-21 PROCEDURE — 99214 OFFICE O/P EST MOD 30 MIN: CPT | Performed by: FAMILY MEDICINE

## 2018-02-21 RX ORDER — VALSARTAN 160 MG/1
160 TABLET ORAL 2 TIMES DAILY
COMMUNITY
Start: 2018-01-31 | End: 2018-04-09 | Stop reason: DRUGHIGH

## 2018-02-21 ASSESSMENT — ENCOUNTER SYMPTOMS
SHORTNESS OF BREATH: 0
BLOOD IN STOOL: 0
CHEST TIGHTNESS: 0

## 2018-02-21 NOTE — PROGRESS NOTES
Patient ID: Dominic Gomez 1940      Hypertension   This is a chronic problem. Pertinent negatives include no chest pain, palpitations or shortness of breath. Risk factors: Is concerned because her blood pressure is running much higher home. Running in the [address] 177 systolic. Has been seeing cardiology nurse practitioner. They keep changing her blood pressure medicines she's getting frustrated. Past treatments include angiotensin blockers and beta blockers. There are no compliance problems. Easy bruising: Ongoing for a long time. Worse when she takes steroids or her daily baby aspirin,  once she barely rub her leg and the skin came off. Denies nosebleeds or having her mouth bleeding when she brushes her teeth. One time she stopped taking her baby aspirin and the bruising did improve some. . Review of Systems   HENT: Negative for nosebleeds. Respiratory: Negative for chest tightness and shortness of breath. Cardiovascular: Negative for chest pain, palpitations and leg swelling. Gastrointestinal: Negative for blood in stool. Genitourinary: Negative for hematuria. Hematological: Bruises/bleeds easily.        Patient Active Problem List   Diagnosis    Hyperlipidemia    Raynaud's disease    Hypothyroidism    Constipation    Diabetes mellitus, type 2 ([address]    Osteopenia    COPD, severe ([address]    Essential hypertension    Dusky feet       Past Medical History:   Diagnosis Date    Cataract     Chronic low back pain     Colon polyps     hyperplastic rectal polyp- dx'd in 2011-repeat colonoscopy in 2016    COPD (chronic obstructive pulmonary disease) (HCC)     COPD, severe ([address] 6/18/2015    Diverticulosis of colon 10/2011    moderately severe of descending colon and sigmoid colon    Hyperlipidemia     mixed    Hypertension     essential    Hypothyroidism dx'd 2006    acquired    Neuropathy ([address]     peripheral neuopathy    Osteoarthritis     affecting primarily the low back and hips; spinal stenosis    Osteopenia 6/15/2015    Per[facility]     dx'd in 2009    Raynaud's disease     Screening for cardiovascular condition 5/2008    Stress Test incomplete    Spinal stenosis     Type II or unspecified type diabetes mellitus without mention of complication, not stated as uncontrolled     dx'd in 6996; complications include peripheral  neuropathy       Past Surgical History:   Procedure Laterality Date    CARDIOVASCULAR STRESS TEST  11/2014    negative    CARDIOVASCULAR STRESS TEST  12/14/2017    COLONOSCOPY  10/20/2011    diverticulosis, rectal polyp,internal hemorrhoids; repeat colonoscopy in 2016   [address]  2004    TOE DEBRIDEMENT  8/2011    Right foot, fourth toe infection - S/P I & D - Dr Adriane Lara  08/24/2017       Family History   Problem Relation Age of Onset   Christine Ochoa Cancer Mother      Leukemia    Diabetes Father      Type 2    Diabetes Son        Current Outpatient Prescriptions on File Prior to Visit   Medication Sig Dispense Refill    metFORMIN (GLUCOPHAGE) 500 MG tablet Take 1 tablet by mouth 2 times daily (with meals) 180 tablet 3    carvedilol (COREG) 25 MG tablet Take 1 tablet by mouth 2 times daily 180 tablet 3    albuterol (PROVENTIL) (2.5 MG/3ML) 0.083% nebulizer solution 3 mLs 4 times daily as needed   12    magnesium (MAGNESIUM-OXIDE) 250 MG TABS tablet Take 250 mg by mouth daily      Omega 3 1000 MG CAPS Take 1 capsule by mouth 2 times daily      pantoprazole (PROTONIX) 40 MG tablet Take 40 mg by mouth 2 times daily      Probiotic Product (PROBIOTIC COLON SUPPORT PO) Take 2 tablets by mouth daily      atorvastatin (LIPITOR) 20 MG tablet Take 1 tablet by mouth daily 90 tablet 1    glucose monitoring kit (FREESTYLE) monitoring kit 1 kit by Does not apply route daily Dispense whatever brand is covered by insurance 1 kit 0    ANORO ELLIPTA 62.5-25

## 2018-03-29 ENCOUNTER — OFFICE VISIT (OUTPATIENT)
Dept: FAMILY MEDICINE CLINIC | Age: 78
End: 2018-03-29

## 2018-03-29 VITALS
DIASTOLIC BLOOD PRESSURE: 80 MMHG | SYSTOLIC BLOOD PRESSURE: 160 MMHG | WEIGHT: 161.8 LBS | HEIGHT: 64 IN | HEART RATE: 60 BPM | BODY MASS INDEX: 27.62 KG/M2

## 2018-03-29 DIAGNOSIS — J44.9 COPD, SEVERE (HCC): ICD-10-CM

## 2018-03-29 DIAGNOSIS — E03.9 HYPOTHYROIDISM, UNSPECIFIED TYPE: ICD-10-CM

## 2018-03-29 DIAGNOSIS — I10 ESSENTIAL HYPERTENSION: ICD-10-CM

## 2018-03-29 DIAGNOSIS — E78.5 HYPERLIPIDEMIA, UNSPECIFIED HYPERLIPIDEMIA TYPE: ICD-10-CM

## 2018-03-29 DIAGNOSIS — F41.9 ANXIETY: ICD-10-CM

## 2018-03-29 DIAGNOSIS — E11.9 TYPE 2 DIABETES MELLITUS WITHOUT COMPLICATION, WITHOUT LONG-TERM CURRENT USE OF INSULIN (HCC): Primary | ICD-10-CM

## 2018-03-29 LAB
ANION GAP SERPL CALCULATED.3IONS-SCNC: 15 MMOL/L (ref 3–16)
BUN BLDV-MCNC: 16 MG/DL (ref 7–20)
CALCIUM SERPL-MCNC: 9.3 MG/DL (ref 8.3–10.6)
CHLORIDE BLD-SCNC: 100 MMOL/L (ref 99–110)
CO2: 26 MMOL/L (ref 21–32)
CREAT SERPL-MCNC: 0.8 MG/DL (ref 0.6–1.2)
CREATININE URINE: 45.1 MG/DL (ref 28–259)
GFR AFRICAN AMERICAN: >60
GFR NON-AFRICAN AMERICAN: >60
GLUCOSE BLD-MCNC: 111 MG/DL (ref 70–99)
HBA1C MFR BLD: 6.7 %
MICROALBUMIN UR-MCNC: 6.3 MG/DL
MICROALBUMIN/CREAT UR-RTO: 139.7 MG/G (ref 0–30)
POTASSIUM SERPL-SCNC: 4.8 MMOL/L (ref 3.5–5.1)
SODIUM BLD-SCNC: 141 MMOL/L (ref 136–145)
TSH SERPL DL<=0.05 MIU/L-ACNC: 0.59 UIU/ML (ref 0.27–4.2)

## 2018-03-29 PROCEDURE — 36415 COLL VENOUS BLD VENIPUNCTURE: CPT | Performed by: FAMILY MEDICINE

## 2018-03-29 PROCEDURE — 99214 OFFICE O/P EST MOD 30 MIN: CPT | Performed by: FAMILY MEDICINE

## 2018-03-29 PROCEDURE — 83036 HEMOGLOBIN GLYCOSYLATED A1C: CPT | Performed by: FAMILY MEDICINE

## 2018-03-29 RX ORDER — CARVEDILOL 25 MG/1
25 TABLET ORAL 2 TIMES DAILY
Qty: 180 TABLET | Refills: 3 | Status: SHIPPED | OUTPATIENT
Start: 2018-03-29 | End: 2018-12-19

## 2018-03-29 RX ORDER — LEVOTHYROXINE SODIUM 0.05 MG/1
50 TABLET ORAL DAILY
Qty: 30 TABLET | Refills: 11 | Status: SHIPPED | OUTPATIENT
Start: 2018-03-29 | End: 2018-12-19 | Stop reason: SDUPTHER

## 2018-03-29 RX ORDER — ATORVASTATIN CALCIUM 20 MG/1
20 TABLET, FILM COATED ORAL DAILY
Qty: 90 TABLET | Refills: 1 | Status: SHIPPED | OUTPATIENT
Start: 2018-03-29 | End: 2018-12-19 | Stop reason: SDUPTHER

## 2018-03-29 RX ORDER — AZITHROMYCIN 250 MG/1
250 TABLET, FILM COATED ORAL
COMMUNITY
End: 2018-07-23 | Stop reason: ALTCHOICE

## 2018-03-29 RX ORDER — TRIAMTERENE AND HYDROCHLOROTHIAZIDE 37.5; 25 MG/1; MG/1
TABLET ORAL
Qty: 90 TABLET | Refills: 3 | Status: SHIPPED | OUTPATIENT
Start: 2018-03-29 | End: 2018-12-19

## 2018-03-29 ASSESSMENT — ENCOUNTER SYMPTOMS: SHORTNESS OF BREATH: 0

## 2018-03-29 ASSESSMENT — PATIENT HEALTH QUESTIONNAIRE - PHQ9
2. FEELING DOWN, DEPRESSED OR HOPELESS: 0
SUM OF ALL RESPONSES TO PHQ9 QUESTIONS 1 & 2: 0
SUM OF ALL RESPONSES TO PHQ QUESTIONS 1-9: 0
1. LITTLE INTEREST OR PLEASURE IN DOING THINGS: 0

## 2018-03-29 ASSESSMENT — COPD QUESTIONNAIRES: COPD: 1

## 2018-03-29 NOTE — PROGRESS NOTES
lb 12.8 oz (73.4 kg)   02/21/18 166 lb 3.2 oz (75.4 kg)   01/11/18 163 lb 3.2 oz (74 kg)     BP Readings from Last 3 Encounters:   03/29/18 (!) 160/80   02/21/18 (!) 140/70   01/11/18 (!) 120/58          Results for orders placed or performed in visit on 03/29/18   POCT glycosylated hemoglobin (Hb A1C)   Result Value Ref Range    Hemoglobin A1C 6.7 %     The 10-year ASCVD risk score (Johnnie Arzate et al., 2013) is: 59.6%    Values used to calculate the score:      Age: 69 years      Sex: Female      Is Non- : No      Diabetic: Yes      Tobacco smoker: No      Systolic Blood Pressure: 973 mmHg      Is BP treated: Yes      HDL Cholesterol: 59 mg/dL      Total Cholesterol: 172 mg/dL  Lab Review   Hospital Outpatient Visit on 02/21/2018   Component Date Value    WBC 02/21/2018 13.4*    RBC 02/21/2018 4.17*    Hemoglobin 02/21/2018 12.4*    Hematocrit 02/21/2018 39.0     MCV 02/21/2018 93.5     MCH 02/21/2018 29.7     MCHC 02/21/2018 31.8*    RDW 02/21/2018 15.9*    Platelets 71/24/7850 277     MPV 02/21/2018 10.1     Differential Type 02/21/2018 AUTOMATED DIFFERENTIAL     Segs Relative 02/21/2018 85.3*    Lymphocytes % 02/21/2018 10.0*    Monocytes % 02/21/2018 3.3     Eosinophils % 02/21/2018 0.7     Basophils % 02/21/2018 0.3     Segs Absolute 02/21/2018 11.4     Lymphocytes # 02/21/2018 1.3     Monocytes # 02/21/2018 0.4     Eosinophils # 02/21/2018 0.1     Basophils # 02/21/2018 0.0     Nucleated RBC % 02/21/2018 0.0     Total Nucleated RBC 02/21/2018 0.0     Total Immature Neutrophil 02/21/2018 0.06     Immature Neutrophil % 02/21/2018 0.4     Protime 02/21/2018 10.7     INR 02/21/2018 0.92     aPTT 02/21/2018 25.8    Office Visit on 01/11/2018   Component Date Value    Hemoglobin A1C 01/11/2018 8.0    Office Visit on 10/02/2017   Component Date Value    Hemoglobin A1C 10/02/2017 7.7        BP monitor: avg 's    Assessment:      1.  Type 2 diabetes mellitus

## 2018-03-30 ENCOUNTER — PATIENT MESSAGE (OUTPATIENT)
Dept: FAMILY MEDICINE CLINIC | Age: 78
End: 2018-03-30

## 2018-04-03 ENCOUNTER — TELEPHONE (OUTPATIENT)
Dept: FAMILY MEDICINE CLINIC | Age: 78
End: 2018-04-03

## 2018-04-09 ENCOUNTER — TELEPHONE (OUTPATIENT)
Dept: FAMILY MEDICINE CLINIC | Age: 78
End: 2018-04-09

## 2018-04-09 DIAGNOSIS — I10 ESSENTIAL HYPERTENSION: Primary | ICD-10-CM

## 2018-04-09 RX ORDER — DOXAZOSIN MESYLATE 4 MG/1
2 TABLET ORAL NIGHTLY
COMMUNITY
End: 2018-06-27

## 2018-04-09 RX ORDER — VALSARTAN 160 MG/1
160 TABLET ORAL 2 TIMES DAILY
COMMUNITY
End: 2018-07-18 | Stop reason: ALTCHOICE

## 2018-04-09 RX ORDER — VALSARTAN 320 MG/1
320 TABLET ORAL DAILY
Qty: 90 TABLET | Refills: 0 | Status: SHIPPED | OUTPATIENT
Start: 2018-04-09 | End: 2018-04-09 | Stop reason: ALTCHOICE

## 2018-04-11 ENCOUNTER — TELEPHONE (OUTPATIENT)
Dept: FAMILY MEDICINE CLINIC | Age: 78
End: 2018-04-11

## 2018-06-27 ENCOUNTER — OFFICE VISIT (OUTPATIENT)
Dept: FAMILY MEDICINE CLINIC | Age: 78
End: 2018-06-27

## 2018-06-27 VITALS
OXYGEN SATURATION: 96 % | TEMPERATURE: 97.8 F | HEART RATE: 66 BPM | BODY MASS INDEX: 28.54 KG/M2 | DIASTOLIC BLOOD PRESSURE: 60 MMHG | SYSTOLIC BLOOD PRESSURE: 120 MMHG | WEIGHT: 165 LBS

## 2018-06-27 DIAGNOSIS — T14.8XXA BRUISING: ICD-10-CM

## 2018-06-27 DIAGNOSIS — R80.9 TYPE 2 DIABETES MELLITUS WITH MICROALBUMINURIA, WITHOUT LONG-TERM CURRENT USE OF INSULIN (HCC): Primary | ICD-10-CM

## 2018-06-27 DIAGNOSIS — E03.9 HYPOTHYROIDISM, UNSPECIFIED TYPE: ICD-10-CM

## 2018-06-27 DIAGNOSIS — J42 ACUTE EXACERBATION OF CHRONIC BRONCHITIS (HCC): ICD-10-CM

## 2018-06-27 DIAGNOSIS — E11.29 TYPE 2 DIABETES MELLITUS WITH MICROALBUMINURIA, WITHOUT LONG-TERM CURRENT USE OF INSULIN (HCC): Primary | ICD-10-CM

## 2018-06-27 DIAGNOSIS — R07.9 CHEST PAIN, UNSPECIFIED TYPE: ICD-10-CM

## 2018-06-27 DIAGNOSIS — I10 ESSENTIAL HYPERTENSION: ICD-10-CM

## 2018-06-27 DIAGNOSIS — J44.9 COPD, SEVERE (HCC): ICD-10-CM

## 2018-06-27 DIAGNOSIS — L84 FOOT CALLUS: ICD-10-CM

## 2018-06-27 DIAGNOSIS — J20.9 ACUTE EXACERBATION OF CHRONIC BRONCHITIS (HCC): ICD-10-CM

## 2018-06-27 LAB — HBA1C MFR BLD: 7.6 %

## 2018-06-27 PROCEDURE — 93000 ELECTROCARDIOGRAM COMPLETE: CPT | Performed by: FAMILY MEDICINE

## 2018-06-27 PROCEDURE — 99214 OFFICE O/P EST MOD 30 MIN: CPT | Performed by: FAMILY MEDICINE

## 2018-06-27 PROCEDURE — 83036 HEMOGLOBIN GLYCOSYLATED A1C: CPT | Performed by: FAMILY MEDICINE

## 2018-06-27 RX ORDER — LEVOFLOXACIN 500 MG/1
500 TABLET, FILM COATED ORAL DAILY
Qty: 10 TABLET | Refills: 0 | Status: SHIPPED | OUTPATIENT
Start: 2018-06-27 | End: 2018-07-07

## 2018-06-27 ASSESSMENT — ENCOUNTER SYMPTOMS
SHORTNESS OF BREATH: 1
COUGH: 1

## 2018-06-27 ASSESSMENT — COPD QUESTIONNAIRES: COPD: 1

## 2018-07-18 ENCOUNTER — TELEPHONE (OUTPATIENT)
Dept: FAMILY MEDICINE CLINIC | Age: 78
End: 2018-07-18

## 2018-07-18 DIAGNOSIS — I10 ESSENTIAL HYPERTENSION: Primary | ICD-10-CM

## 2018-07-18 RX ORDER — LOSARTAN POTASSIUM 50 MG/1
50 TABLET ORAL DAILY
Qty: 90 TABLET | Refills: 3 | Status: SHIPPED | OUTPATIENT
Start: 2018-07-18 | End: 2018-12-19

## 2018-07-23 ENCOUNTER — OFFICE VISIT (OUTPATIENT)
Dept: FAMILY MEDICINE CLINIC | Age: 78
End: 2018-07-23

## 2018-07-23 VITALS
WEIGHT: 165 LBS | BODY MASS INDEX: 27.49 KG/M2 | DIASTOLIC BLOOD PRESSURE: 60 MMHG | TEMPERATURE: 97.7 F | HEIGHT: 65 IN | SYSTOLIC BLOOD PRESSURE: 118 MMHG

## 2018-07-23 DIAGNOSIS — I10 UNCONTROLLED HYPERTENSION: Primary | ICD-10-CM

## 2018-07-23 PROCEDURE — 99213 OFFICE O/P EST LOW 20 MIN: CPT | Performed by: NURSE PRACTITIONER

## 2018-07-23 ASSESSMENT — ENCOUNTER SYMPTOMS
WHEEZING: 0
SHORTNESS OF BREATH: 1
NAUSEA: 1
VOMITING: 0
DIARRHEA: 0
CHEST TIGHTNESS: 0
COUGH: 0

## 2018-07-23 NOTE — PATIENT INSTRUCTIONS
We are committed to providing you the best care possible. If you receive a survey after visiting one of our offices, please take time to share your experience concerning your physician office visit. These surveys are confidential and no health information about you is shared. We are eager to improve for you and we are counting on your feedback to help make that happen. Try taking losartan midday rather than in the morning. Continue monitoring blood pressure frequently (record reading and time). Take log to follow up appointment with cardiology. In the meantime, avoid NSAIDs (Motrin, Aleve etc) and maintain low sodium diet. Patient Education        DASH Diet: Care Instructions  Your Care Instructions    The DASH diet is an eating plan that can help lower your blood pressure. DASH stands for Dietary Approaches to Stop Hypertension. Hypertension is high blood pressure. The DASH diet focuses on eating foods that are high in calcium, potassium, and magnesium. These nutrients can lower blood pressure. The foods that are highest in these nutrients are fruits, vegetables, low-fat dairy products, nuts, seeds, and legumes. But taking calcium, potassium, and magnesium supplements instead of eating foods that are high in those nutrients does not have the same effect. The DASH diet also includes whole grains, fish, and poultry. The DASH diet is one of several lifestyle changes your doctor may recommend to lower your high blood pressure. Your doctor may also want you to decrease the amount of sodium in your diet. Lowering sodium while following the DASH diet can lower blood pressure even further than just the DASH diet alone. Follow-up care is a key part of your treatment and safety. Be sure to make and go to all appointments, and call your doctor if you are having problems. It's also a good idea to know your test results and keep a list of the medicines you take.   How can you care for yourself at home?  Following the DASH diet  · Eat 4 to 5 servings of fruit each day. A serving is 1 medium-sized piece of fruit, ½ cup chopped or canned fruit, 1/4 cup dried fruit, or 4 ounces (½ cup) of fruit juice. Choose fruit more often than fruit juice. · Eat 4 to 5 servings of vegetables each day. A serving is 1 cup of lettuce or raw leafy vegetables, ½ cup of chopped or cooked vegetables, or 4 ounces (½ cup) of vegetable juice. Choose vegetables more often than vegetable juice. · Get 2 to 3 servings of low-fat and fat-free dairy each day. A serving is 8 ounces of milk, 1 cup of yogurt, or 1 ½ ounces of cheese. · Eat 6 to 8 servings of grains each day. A serving is 1 slice of bread, 1 ounce of dry cereal, or ½ cup of cooked rice, pasta, or cooked cereal. Try to choose whole-grain products as much as possible. · Limit lean meat, poultry, and fish to 2 servings each day. A serving is 3 ounces, about the size of a deck of cards. · Eat 4 to 5 servings of nuts, seeds, and legumes (cooked dried beans, lentils, and split peas) each week. A serving is 1/3 cup of nuts, 2 tablespoons of seeds, or ½ cup of cooked beans or peas. · Limit fats and oils to 2 to 3 servings each day. A serving is 1 teaspoon of vegetable oil or 2 tablespoons of salad dressing. · Limit sweets and added sugars to 5 servings or less a week. A serving is 1 tablespoon jelly or jam, ½ cup sorbet, or 1 cup of lemonade. · Eat less than 2,300 milligrams (mg) of sodium a day. If you limit your sodium to 1,500 mg a day, you can lower your blood pressure even more. Tips for success  · Start small. Do not try to make dramatic changes to your diet all at once. You might feel that you are missing out on your favorite foods and then be more likely to not follow the plan. Make small changes, and stick with them. Once those changes become habit, add a few more changes.   · Try some of the following:  ¨ Make it a goal to eat a fruit or vegetable at every meal and at comes down on its own. Get help right away.   Call your doctor now or seek immediate care if:    · Your blood pressure is much higher than normal (such as 180/110 or higher), but you don't have symptoms.     · You think high blood pressure is causing symptoms, such as:  ¨ Severe headache. ¨ Blurry vision.    Watch closely for changes in your health, and be sure to contact your doctor if:    · Your blood pressure measures 140/90 or higher at least 2 times. That means the top number is 140 or higher or the bottom number is 90 or higher, or both.     · You think you may be having side effects from your blood pressure medicine.     · Your blood pressure is usually normal, but it goes above normal at least 2 times. Where can you learn more? Go to [email or web address]. org and sign in to your [employer] account. Enter L921 in the [employer] box to learn more about \"High Blood Pressure: Care Instructions. \"     If you do not have an account, please click on the \"Sign Up Now\" link. Current as of: December 6, 2017  Content Version: 11.6  © 0910-4766 [employer]. Care instructions adapted under license by [facility] ([facility]). If you have questions about a medical condition or this instruction, always ask your healthcare professional. [address] any warranty or liability for your use of this information.

## 2018-08-17 ENCOUNTER — TELEPHONE (OUTPATIENT)
Dept: FAMILY MEDICINE CLINIC | Age: 78
End: 2018-08-17

## 2018-08-17 NOTE — TELEPHONE ENCOUNTER
The patient was seen on 7/23/18 and a cardiology referral was generated. The patient specifically requested for the referral to be sent to Dr. Rod Alvarado. Referral was faxed and per Evert Beck at his office, they contacted the patient and she refused to schedule, stating she would call back with the name of the cardiologist she wanted to see, but she never called them back. I called today to see if the patient had established with anyone. She said \"I got it taken care of\" and hung up while I was still speaking. I was trying to confirm who she was seeing for cardiology.

## 2018-10-30 ENCOUNTER — HOSPITAL ENCOUNTER (OUTPATIENT)
Dept: ULTRASOUND IMAGING | Age: 78
Discharge: HOME | End: 2018-10-30
Payer: MEDICARE

## 2018-10-30 ENCOUNTER — OFFICE VISIT (OUTPATIENT)
Dept: FAMILY MEDICINE CLINIC | Age: 78
End: 2018-10-30
Payer: COMMERCIAL

## 2018-10-30 VITALS
WEIGHT: 175.6 LBS | DIASTOLIC BLOOD PRESSURE: 54 MMHG | HEART RATE: 70 BPM | SYSTOLIC BLOOD PRESSURE: 110 MMHG | BODY MASS INDEX: 29.26 KG/M2 | HEIGHT: 65 IN

## 2018-10-30 DIAGNOSIS — M79.89 LEFT LEG SWELLING: Primary | ICD-10-CM

## 2018-10-30 DIAGNOSIS — M79.605 LEFT LEG PAIN: ICD-10-CM

## 2018-10-30 DIAGNOSIS — M79.89 LEFT LEG SWELLING: ICD-10-CM

## 2018-10-30 PROCEDURE — 93971 EXTREMITY STUDY: CPT

## 2018-10-30 PROCEDURE — 99213 OFFICE O/P EST LOW 20 MIN: CPT | Performed by: FAMILY MEDICINE

## 2018-10-30 RX ORDER — HYDRALAZINE HYDROCHLORIDE 25 MG/1
25 TABLET, FILM COATED ORAL 2 TIMES DAILY
COMMUNITY
End: 2018-12-19

## 2018-10-30 RX ORDER — LABETALOL 200 MG/1
200 TABLET, FILM COATED ORAL 2 TIMES DAILY
COMMUNITY
End: 2020-08-31 | Stop reason: SDUPTHER

## 2018-10-30 ASSESSMENT — ENCOUNTER SYMPTOMS: SHORTNESS OF BREATH: 0

## 2018-10-30 NOTE — PROGRESS NOTES
Patient ID: Michelle Erazo [id number]    Chief Complaint   Patient presents with    Leg Pain     5 days. no trauma.  Leg Swelling         HPI leg pain as above. Ongoing for 5 days. No trauma. worsening and leg is becoming more and more bruised. Denies any bruising elsewhere. Leg hurts    Review of Systems   Respiratory: Negative for shortness of breath. Cardiovascular: Negative. Musculoskeletal: Positive for gait problem and joint swelling.        Patient Active Problem List   Diagnosis    Hyperlipidemia    Raynaud's disease    Hypothyroidism    Constipation    Type 2 diabetes mellitus with microalbuminuria ([address]    Osteopenia    COPD, severe ([address]    Essential hypertension    Dusky feet       Past Medical History:   Diagnosis Date    Cataract     Chronic low back pain     Colon polyps     hyperplastic rectal polyp- dx'd in 2011-repeat colonoscopy in 2016    COPD (chronic obstructive pulmonary disease) (HCC)     COPD, severe ([address] 6/18/2015    Diverticulosis of colon 10/2011    moderately severe of descending colon and sigmoid colon    Hyperlipidemia     mixed    Hypertension     essential    Hypothyroidism dx'd 2006    acquired    Neuropathy     peripheral neuopathy    Osteoarthritis     affecting primarily the low back and hips; spinal stenosis    Osteopenia 6/15/2015    Peripheral vascular disease ([address]     dx'd in 2009    Raynaud's disease     Screening for cardiovascular condition 5/2008    Stress Test incomplete    Spinal stenosis     Type II or unspecified type diabetes mellitus without mention of complication, not stated as uncontrolled     dx'd in 0693; complications include peripheral  neuropathy       Past Surgical History:   Procedure Laterality Date    CARDIOVASCULAR STRESS TEST  11/2014    negative    CARDIOVASCULAR STRESS TEST  12/14/2017    COLONOSCOPY  10/20/2011    diverticulosis, rectal polyp,internal hemorrhoids; repeat colonoscopy in 2016    FOOT

## 2018-10-31 ENCOUNTER — TELEPHONE (OUTPATIENT)
Dept: FAMILY MEDICINE CLINIC | Age: 78
End: 2018-10-31

## 2018-10-31 NOTE — TELEPHONE ENCOUNTER
Patient stated the swelling is going down on the inside of her legs, however the swelling on the outside of the leg has not gone down. Pain 3/10.  Please advise

## 2018-11-01 NOTE — TELEPHONE ENCOUNTER
Recommend [facility] ER since both legs are involved and the left leg is worsening.

## 2018-11-28 LAB
ALBUMIN SERUM: 3.9 G/DL (ref 3.4–5)
ANION GAP SERPL CALCULATED.3IONS-SCNC: 8 MMOL/L (ref 7–16)
BUN BLDV-MCNC: 26 MG/DL (ref 7–18)
CALCIUM SERPL-MCNC: 8.9 MG/DL (ref 8.5–10.1)
CHLORIDE BLD-SCNC: 104 MMOL/L (ref 98–107)
CO2: 24 MMOL/L (ref 21–32)
CREATININE + EGFR PANEL: 1.2 MG/DL (ref 0.6–1.3)
GFR AFRICAN AMERICAN: 53 ML/MIN/1.73M2
GFR NON-AFRICAN AMERICAN: 44 ML/MIN/1.73M2
GLUCOSE: 105 MG/DL (ref 74–106)
PHOSPHORUS: 2.6 MG/DL (ref 2.5–4.9)
POTASSIUM SERPL-SCNC: 4.4 MMOL/L (ref 3.5–5.1)
SODIUM BLD-SCNC: 136 MMOL/L (ref 136–145)

## 2018-12-01 LAB
METANEPHRINE FREE PLASMA: 75 PG/ML
NORMETANEPHRINE PLASMA: 306 PG/ML

## 2018-12-19 ENCOUNTER — OFFICE VISIT (OUTPATIENT)
Dept: FAMILY MEDICINE CLINIC | Age: 78
End: 2018-12-19
Payer: MEDICARE

## 2018-12-19 VITALS
BODY MASS INDEX: 26.92 KG/M2 | DIASTOLIC BLOOD PRESSURE: 60 MMHG | HEIGHT: 65 IN | HEART RATE: 68 BPM | WEIGHT: 161.6 LBS | SYSTOLIC BLOOD PRESSURE: 110 MMHG

## 2018-12-19 DIAGNOSIS — I10 ESSENTIAL HYPERTENSION: ICD-10-CM

## 2018-12-19 DIAGNOSIS — E78.5 HYPERLIPIDEMIA, UNSPECIFIED HYPERLIPIDEMIA TYPE: ICD-10-CM

## 2018-12-19 DIAGNOSIS — E03.9 HYPOTHYROIDISM, UNSPECIFIED TYPE: ICD-10-CM

## 2018-12-19 DIAGNOSIS — E11.29 TYPE 2 DIABETES MELLITUS WITH MICROALBUMINURIA, WITHOUT LONG-TERM CURRENT USE OF INSULIN (HCC): Primary | ICD-10-CM

## 2018-12-19 DIAGNOSIS — R25.2 CRAMPS OF LEFT LOWER EXTREMITY: ICD-10-CM

## 2018-12-19 DIAGNOSIS — R80.9 TYPE 2 DIABETES MELLITUS WITH MICROALBUMINURIA, WITHOUT LONG-TERM CURRENT USE OF INSULIN (HCC): Primary | ICD-10-CM

## 2018-12-19 DIAGNOSIS — F41.9 ANXIETY: ICD-10-CM

## 2018-12-19 DIAGNOSIS — M21.961 FOOT DEFORMITY, BILATERAL: ICD-10-CM

## 2018-12-19 DIAGNOSIS — M21.962 FOOT DEFORMITY, BILATERAL: ICD-10-CM

## 2018-12-19 LAB — HBA1C MFR BLD: 7.1 %

## 2018-12-19 PROCEDURE — 99214 OFFICE O/P EST MOD 30 MIN: CPT | Performed by: FAMILY MEDICINE

## 2018-12-19 PROCEDURE — 83036 HEMOGLOBIN GLYCOSYLATED A1C: CPT | Performed by: FAMILY MEDICINE

## 2018-12-19 RX ORDER — LOSARTAN POTASSIUM AND HYDROCHLOROTHIAZIDE 25; 100 MG/1; MG/1
1 TABLET ORAL DAILY
COMMUNITY
Start: 2018-09-25 | End: 2019-03-28

## 2018-12-19 RX ORDER — ATORVASTATIN CALCIUM 20 MG/1
20 TABLET, FILM COATED ORAL DAILY
Qty: 90 TABLET | Refills: 1 | Status: SHIPPED | OUTPATIENT
Start: 2018-12-19 | End: 2019-06-29 | Stop reason: SDUPTHER

## 2018-12-19 RX ORDER — LEVOTHYROXINE SODIUM 0.05 MG/1
50 TABLET ORAL DAILY
Qty: 30 TABLET | Refills: 11 | Status: SHIPPED | OUTPATIENT
Start: 2018-12-19 | End: 2019-12-18 | Stop reason: SDUPTHER

## 2018-12-19 RX ORDER — ALBUTEROL SULFATE 90 UG/1
2 AEROSOL, METERED RESPIRATORY (INHALATION) EVERY 6 HOURS PRN
COMMUNITY
Start: 2018-06-29

## 2018-12-19 RX ORDER — CARVEDILOL 25 MG/1
25 TABLET ORAL 2 TIMES DAILY
Qty: 180 TABLET | Refills: 3 | Status: SHIPPED | OUTPATIENT
Start: 2018-12-19 | End: 2019-06-26

## 2018-12-19 ASSESSMENT — ENCOUNTER SYMPTOMS
SHORTNESS OF BREATH: 1
DIARRHEA: 0
CONSTIPATION: 0
BLOOD IN STOOL: 0
COUGH: 1
CHEST TIGHTNESS: 0

## 2018-12-19 ASSESSMENT — COPD QUESTIONNAIRES: COPD: 1

## 2018-12-19 NOTE — PATIENT INSTRUCTIONS
Patient Education        Nighttime Leg Cramps: Care Instructions  Your Care Instructions    Nighttime leg cramps happen when a leg muscle tightens up suddenly. This most often happens in the calf. But cramps in the thigh or foot are also common. Cramps often occur just as you fall asleep or wake up. Leg cramps can be painful. They can last a few seconds to a few minutes. Though they are common, experts don't know exactly what causes them. To treat muscle cramps, you can stretch and massage the muscle. If cramps keep coming back, your doctor may prescribe medicine that relaxes your muscles. Follow-up care is a key part of your treatment and safety. Be sure to make and go to all appointments, and call your doctor if you are having problems. It's also a good idea to know your test results and keep a list of the medicines you take. How can you care for yourself at home? · To stop a leg cramp, sit down and straighten your leg as you bend your foot up toward your knee. It may help to place a rolled towel under the ball of your foot and, while you hold the towel at both ends, gently pull the towel toward you while you keep your knee straight. This stretches the calf muscles. The cramp usually goes away after a few minutes. · Take a warm shower or bath to relax the muscle. Some people find that a heating pad placed on the muscle can also help. Others get relief by rubbing the calf with an ice pack. · Stretch your muscles every day, especially before and after exercise and at bedtime. Regular stretching can relax your muscles and may prevent cramps. · Do not suddenly increase the amount of exercise you get. Increase your exercise a little each week. · If your doctor prescribes medicine, take it exactly as prescribed. Call your doctor if you think you are having a problem with your medicine.   · Ask your doctor if you can take an over-the-counter pain medicine, such as acetaminophen (Tylenol), ibuprofen (Advil,

## 2018-12-19 NOTE — PROGRESS NOTES
1+ on the right side, and 1+ on the left side. Posterior tibial pulses are 1+ on the right side, and 1+ on the left side. No carotid bruits   Pulmonary/Chest: Effort normal and breath sounds normal. No respiratory distress. She has no wheezes. She has no rales. Abdominal: Soft. Normal aorta. She exhibits no distension and no mass. There is no tenderness. Musculoskeletal: She exhibits no edema. Left hip: She exhibits no tenderness, no swelling and no deformity. Left upper leg: She exhibits no tenderness, no swelling, no edema and no deformity. Legs:       Right foot: There is normal range of motion and no deformity. Left foot: There is normal range of motion and no deformity. Feet:    Feet:   Right Foot:   Protective Sensation: 5 sites tested. 5 sites sensed. Skin Integrity: Positive for erythema and callus. Negative for ulcer, skin breakdown or dry skin. Left Foot:   Protective Sensation: 5 sites tested. 5 sites sensed. Skin Integrity: Positive for erythema and callus. Negative for ulcer, skin breakdown or dry skin. Neurological: She is alert and oriented to person, place, and time. Intact monofilament test both feet     Skin: Skin is warm, dry and intact. Psychiatric: She has a normal mood and affect. Nursing note and vitals reviewed. Vitals:    12/19/18 1358   BP: 110/60   Site: Left Upper Arm   Position: Sitting   Cuff Size: Medium Adult   Pulse: 68   Weight: 161 lb 9.6 oz (73.3 kg)   Height: 5' 5\" (1.651 m)     Body mass index is 26.89 kg/m².      Wt Readings from Last 3 Encounters:   12/19/18 161 lb 9.6 oz (73.3 kg)   10/30/18 175 lb 9.6 oz (79.7 kg)   07/23/18 165 lb (74.8 kg)     BP Readings from Last 3 Encounters:   12/19/18 110/60   10/30/18 (!) 110/54   07/23/18 118/60          Results for orders placed or performed in visit on 12/19/18   POCT glycosylated hemoglobin (Hb A1C)   Result Value Ref Range    Hemoglobin A1C 7.1 %     The 10-year LDL Calculated 08/29/2018 32     VLDL Cholesterol Calcula* 08/29/2018 27    Office Visit on 06/27/2018   Component Date Value    Hemoglobin A1C 06/27/2018 7.6            Assessment:       Diagnosis Orders   1. Type 2 diabetes mellitus with microalbuminuria, without long-term current use of insulin (HCC)  POCT glycosylated hemoglobin (Hb A1C)    aspirin 81 MG tablet    blood glucose test strips (ONE TOUCH ULTRA TEST) strip    metFORMIN (GLUCOPHAGE) 500 MG tablet   2. Essential hypertension  losartan-hydrochlorothiazide (HYZAAR) 100-25 MG per tablet    carvedilol (COREG) 25 MG tablet   3. Anxiety  sertraline (ZOLOFT) 50 MG tablet   4. Hyperlipidemia, unspecified hyperlipidemia type  atorvastatin (LIPITOR) 20 MG tablet   5. Hypothyroidism, unspecified type  levothyroxine (SYNTHROID) 50 MCG tablet   6. Foot deformity, bilateral     7. Cramps of left lower extremity             Plan:      HTN stable continue current medications    Continue with the Zoloft for the anxiety.     Hypothyroid stable: continue current medicaiton    REcommend podiatry--refuses    Refuses bone density testing    Leg stretching exercises demonstrated

## 2019-01-24 LAB
ALBUMIN SERPL-MCNC: 4.2 G/DL
BUN / CREAT RATIO: ABNORMAL
BUN BLDV-MCNC: 29 MG/DL
CALCIUM SERPL-MCNC: 9.8 MG/DL
CHLORIDE BLD-SCNC: 101 MMOL/L
CO2: 24 MMOL/L
CREAT SERPL-MCNC: 1.7 MG/DL
GLUCOSE: 209
PHOSPHORUS: 4.1 MG/DL
POTASSIUM SERPL-SCNC: 5.7 MMOL/L
SODIUM BLD-SCNC: 134 MMOL/L

## 2019-01-29 ENCOUNTER — TELEPHONE (OUTPATIENT)
Dept: FAMILY MEDICINE CLINIC | Age: 79
End: 2019-01-29

## 2019-02-02 LAB
ALBUMIN SERPL-MCNC: 3.9 G/DL
BUN / CREAT RATIO: NORMAL
BUN BLDV-MCNC: 29 MG/DL
CALCIUM SERPL-MCNC: 9.6 MG/DL
CHLORIDE BLD-SCNC: 104 MMOL/L
CO2: 27 MMOL/L
CREAT SERPL-MCNC: 1.2 MG/DL
GLUCOSE: 149
PHOSPHORUS: 3.9 MG/DL
POTASSIUM SERPL-SCNC: 4.7 MMOL/L
SODIUM BLD-SCNC: 136 MMOL/L

## 2019-02-05 ENCOUNTER — TELEPHONE (OUTPATIENT)
Dept: FAMILY MEDICINE CLINIC | Age: 79
End: 2019-02-05

## 2019-02-05 DIAGNOSIS — R80.9 TYPE 2 DIABETES MELLITUS WITH MICROALBUMINURIA, WITHOUT LONG-TERM CURRENT USE OF INSULIN (HCC): Primary | ICD-10-CM

## 2019-02-05 DIAGNOSIS — E11.29 TYPE 2 DIABETES MELLITUS WITH MICROALBUMINURIA, WITHOUT LONG-TERM CURRENT USE OF INSULIN (HCC): Primary | ICD-10-CM

## 2019-02-05 RX ORDER — LANCETS 30 GAUGE
1 EACH MISCELLANEOUS 2 TIMES DAILY
Qty: 180 EACH | Refills: 3 | Status: SHIPPED | OUTPATIENT
Start: 2019-02-05 | End: 2020-06-10 | Stop reason: SDUPTHER

## 2019-03-28 ENCOUNTER — OFFICE VISIT (OUTPATIENT)
Dept: FAMILY MEDICINE CLINIC | Age: 79
End: 2019-03-28
Payer: MEDICARE

## 2019-03-28 VITALS
DIASTOLIC BLOOD PRESSURE: 80 MMHG | HEART RATE: 62 BPM | WEIGHT: 158 LBS | HEIGHT: 65 IN | BODY MASS INDEX: 26.33 KG/M2 | SYSTOLIC BLOOD PRESSURE: 118 MMHG

## 2019-03-28 DIAGNOSIS — M25.561 CHRONIC PAIN OF RIGHT KNEE: ICD-10-CM

## 2019-03-28 DIAGNOSIS — R80.9 TYPE 2 DIABETES MELLITUS WITH MICROALBUMINURIA, WITHOUT LONG-TERM CURRENT USE OF INSULIN (HCC): Primary | ICD-10-CM

## 2019-03-28 DIAGNOSIS — R09.89 DIMINISHED PULSES IN LOWER EXTREMITY: ICD-10-CM

## 2019-03-28 DIAGNOSIS — G89.29 CHRONIC PAIN OF RIGHT KNEE: ICD-10-CM

## 2019-03-28 DIAGNOSIS — E11.29 TYPE 2 DIABETES MELLITUS WITH MICROALBUMINURIA, WITHOUT LONG-TERM CURRENT USE OF INSULIN (HCC): Primary | ICD-10-CM

## 2019-03-28 LAB — HBA1C MFR BLD: 7.5 %

## 2019-03-28 PROCEDURE — 99214 OFFICE O/P EST MOD 30 MIN: CPT | Performed by: FAMILY MEDICINE

## 2019-03-28 PROCEDURE — 83036 HEMOGLOBIN GLYCOSYLATED A1C: CPT | Performed by: FAMILY MEDICINE

## 2019-03-28 RX ORDER — AZITHROMYCIN 250 MG/1
250 TABLET, FILM COATED ORAL DAILY
COMMUNITY
End: 2019-06-26

## 2019-03-28 RX ORDER — ACARBOSE 50 MG/1
50 TABLET ORAL 3 TIMES DAILY PRN
Qty: 90 TABLET | Refills: 3 | Status: SHIPPED | OUTPATIENT
Start: 2019-03-28 | End: 2020-04-20 | Stop reason: SDUPTHER

## 2019-03-28 RX ORDER — SPIRONOLACTONE 100 MG/1
100 TABLET, FILM COATED ORAL DAILY
COMMUNITY
End: 2019-06-26

## 2019-03-28 ASSESSMENT — COPD QUESTIONNAIRES: COPD: 1

## 2019-03-28 ASSESSMENT — PATIENT HEALTH QUESTIONNAIRE - PHQ9
SUM OF ALL RESPONSES TO PHQ QUESTIONS 1-9: 0
1. LITTLE INTEREST OR PLEASURE IN DOING THINGS: 0
SUM OF ALL RESPONSES TO PHQ QUESTIONS 1-9: 0
2. FEELING DOWN, DEPRESSED OR HOPELESS: 0
SUM OF ALL RESPONSES TO PHQ9 QUESTIONS 1 & 2: 0

## 2019-03-28 ASSESSMENT — ENCOUNTER SYMPTOMS
SHORTNESS OF BREATH: 1
COUGH: 1

## 2019-06-26 ENCOUNTER — OFFICE VISIT (OUTPATIENT)
Dept: FAMILY MEDICINE CLINIC | Age: 79
End: 2019-06-26
Payer: MEDICARE

## 2019-06-26 VITALS
SYSTOLIC BLOOD PRESSURE: 128 MMHG | HEIGHT: 65 IN | DIASTOLIC BLOOD PRESSURE: 80 MMHG | OXYGEN SATURATION: 92 % | WEIGHT: 165.4 LBS | HEART RATE: 72 BPM | BODY MASS INDEX: 27.56 KG/M2

## 2019-06-26 DIAGNOSIS — I73.00 RAYNAUD'S DISEASE WITHOUT GANGRENE: ICD-10-CM

## 2019-06-26 DIAGNOSIS — E78.5 HYPERLIPIDEMIA, UNSPECIFIED HYPERLIPIDEMIA TYPE: ICD-10-CM

## 2019-06-26 DIAGNOSIS — E03.9 HYPOTHYROIDISM, UNSPECIFIED TYPE: ICD-10-CM

## 2019-06-26 DIAGNOSIS — R80.9 TYPE 2 DIABETES MELLITUS WITH MICROALBUMINURIA, WITHOUT LONG-TERM CURRENT USE OF INSULIN (HCC): Primary | ICD-10-CM

## 2019-06-26 DIAGNOSIS — E11.29 TYPE 2 DIABETES MELLITUS WITH MICROALBUMINURIA, WITHOUT LONG-TERM CURRENT USE OF INSULIN (HCC): Primary | ICD-10-CM

## 2019-06-26 DIAGNOSIS — J44.9 COPD, SEVERE (HCC): ICD-10-CM

## 2019-06-26 LAB — HBA1C MFR BLD: 7.4 %

## 2019-06-26 PROCEDURE — 83036 HEMOGLOBIN GLYCOSYLATED A1C: CPT | Performed by: FAMILY MEDICINE

## 2019-06-26 PROCEDURE — 99214 OFFICE O/P EST MOD 30 MIN: CPT | Performed by: FAMILY MEDICINE

## 2019-06-26 RX ORDER — LABETALOL 100 MG/1
100 TABLET, FILM COATED ORAL EVERY MORNING
COMMUNITY
End: 2020-08-18

## 2019-06-26 NOTE — PROGRESS NOTES
Patient ID: Dakota Austin 1940    . Chief Complaint   Patient presents with    Diabetes    COPD         Diabetes   She presents for her follow-up diabetic visit. She has type 2 diabetes mellitus. Her disease course has been stable. Pertinent negatives for hypoglycemia include no nervousness/anxiousness. Pertinent negatives for diabetes include no chest pain. Symptoms are stable. Risk factors for coronary artery disease include hypertension and tobacco exposure. When asked about current treatments, none were reported. Her overall blood glucose range is 140-180 mg/dl. She does not see a podiatrist.Eye exam is current. Hypertension   This is a chronic problem. The current episode started more than 1 year ago. The problem has been gradually improving since onset. The problem is controlled. Associated symptoms include shortness of breath. Pertinent negatives include no chest pain or palpitations. Risk factors: Nephrologist is managing her blood pressure medication. She plans on switching back to her old cardiologist. Past treatments include angiotensin blockers and beta blockers. There are no compliance problems. COPD   She complains of cough and shortness of breath. Primary symptoms comments: Sees pulmonologist in [place]. Pack of cigarettes lasts 2 months. . This is a chronic problem. The current episode started more than 1 year ago. The problem occurs daily. The problem has been unchanged. Pertinent negatives include no chest pain. Hyperlipidemia   This is a chronic problem. The current episode started more than 1 year ago. Associated symptoms include shortness of breath. Pertinent negatives include no chest pain. Current antihyperlipidemic treatment includes statins. The current treatment provides no improvement of lipids. Compliance problems include medication cost.    Hypothyroid: Takes her medication as directed. Review of Systems   Respiratory: Positive for cough and shortness of breath. Cardiovascular: Negative for chest pain and palpitations. Psychiatric/Behavioral: The patient is not nervous/anxious.         Patient Active Problem List   Diagnosis    Hyperlipidemia    Raynaud's disease    Hypothyroidism    Type 2 diabetes mellitus with microalbuminuria ([address]    Osteopenia    COPD, severe ([address]    Essential hypertension    Dusky feet       Past Medical History:   Diagnosis Date    Cataract     Chronic low back pain     Colon polyps     hyperplastic rectal polyp- dx'd in 2011-repeat colonoscopy in 2016    COPD (chronic obstructive pulmonary disease) (HCC)     COPD, severe ([address] 6/18/2015    Diverticulosis of colon 10/2011    moderately severe of descending colon and sigmoid colon    Hyperlipidemia     mixed    Hypertension     essential    Hypothyroidism dx'd 2006    acquired    Neuropathy     peripheral neuopathy    Osteoarthritis     affecting primarily the low back and hips; spinal stenosis    Osteopenia 6/15/2015    Peripheral vascular disease ([address]     dx'd in 2009    Raynaud's disease     Screening for cardiovascular condition 5/2008    Stress Test incomplete    Spinal stenosis     Type II or unspecified type diabetes mellitus without mention of complication, not stated as uncontrolled     dx'd in 0009; complications include peripheral  neuropathy       Past Surgical History:   Procedure Laterality Date    CARDIOVASCULAR STRESS TEST  11/2014    negative    CARDIOVASCULAR STRESS TEST  12/14/2017    COLONOSCOPY  10/20/2011    diverticulosis, rectal polyp,internal hemorrhoids; repeat colonoscopy in 2016   [address]  2004    TOE DEBRIDEMENT  8/2011    Right foot, fourth toe infection - S/P I & D - Dr Issa Catching ENDOSCOPY  08/24/2017       Family History   Problem Relation Age of Onset    Cancer Mother         Leukemia    Diabetes Father         Type 2    Diabetes Son        Current Outpatient Medications on File Prior to Visit   Medication Sig Dispense Refill    labetalol (NORMODYNE) 100 MG tablet Take 100 mg by mouth every morning      acarbose (PRECOSE) 50 MG tablet Take 1 tablet by mouth 3 times daily as needed (sugar over 200) 90 tablet 3    Lancets MISC 1 each by Does not apply route 2 times daily for 180 doses 180 each 3    aspirin 81 MG tablet Take 81 mg by mouth daily      blood glucose test strips (ONE TOUCH ULTRA TEST) strip Twice daily 100 each 11    levothyroxine (SYNTHROID) 50 MCG tablet Take 1 tablet by mouth daily As directed 30 tablet 11    sertraline (ZOLOFT) 50 MG tablet Take 1 tablet by mouth daily 90 tablet 3    atorvastatin (LIPITOR) 20 MG tablet Take 1 tablet by mouth daily 90 tablet 1    labetalol (NORMODYNE) 200 MG tablet Take 200 mg by mouth 2 times daily      magnesium (MAGNESIUM-OXIDE) 250 MG TABS tablet Take 250 mg by mouth daily      Omega 3 1000 MG CAPS Take 1 capsule by mouth 2 times daily      Probiotic Product (PROBIOTIC COLON SUPPORT PO) Take 2 tablets by mouth daily      glucose monitoring kit (FREESTYLE) monitoring kit 1 kit by Does not apply route daily Dispense whatever brand is covered by insurance 1 kit 0    ANORO ELLIPTA 62.5-25 MCG/INH AEPB inhaler Inhale 1 puff into the lungs daily  6    predniSONE (DELTASONE) 10 MG tablet Take 1 tablet by mouth      OXYGEN 2 L nightly      albuterol sulfate HFA (PROAIR HFA) 108 (90 Base) MCG/ACT inhaler Take 2 puffs by mouth every 6 hours as needed      albuterol (PROVENTIL) (2.5 MG/3ML) 0.083% nebulizer solution 3 mLs 4 times daily as needed   12    ipratropium-albuterol (DUONEB) 0.5-2.5 (3) MG/3ML SOLN nebulizer solution Inhale 1 vial into the lungs 3 times daily  5     No current facility-administered medications on file prior to visit. Objective:         Physical Exam   Constitutional: She appears well-developed and well-nourished. No distress.    HENT:   Head: Normocephalic and atraumatic. Neck: Neck supple. No tracheal deviation present. No thyromegaly present. Cardiovascular: Normal rate, regular rhythm, S1 normal, S2 normal and normal heart sounds. No carotid bruits   Pulmonary/Chest: Effort normal. No respiratory distress. She has decreased breath sounds. She has wheezes. She has rhonchi. Abdominal: Soft. She exhibits no distension and no mass. There is no splenomegaly or hepatomegaly. There is no tenderness. Musculoskeletal: She exhibits no edema. Right lower leg: She exhibits no edema. Left lower leg: She exhibits no edema. Neurological: She is alert. Skin: Skin is warm, dry and intact. Psychiatric: She has a normal mood and affect. Her speech is normal and behavior is normal. She is not actively hallucinating. Cognition and memory are normal. She is attentive. Nursing note and vitals reviewed. Vitals:    06/26/19 1348 06/26/19 1414   BP: 128/80    Site: Left Upper Arm    Position: Sitting    Cuff Size: Medium Adult    Pulse: 60 72   SpO2: 90% 92%   Weight: 165 lb 6.4 oz (75 kg)    Height: 5' 5\" (1.651 m)      Body mass index is 27.52 kg/m².      Wt Readings from Last 3 Encounters:   06/26/19 165 lb 6.4 oz (75 kg)   03/28/19 158 lb (71.7 kg)   12/19/18 161 lb 9.6 oz (73.3 kg)     BP Readings from Last 3 Encounters:   06/26/19 128/80   03/28/19 118/80   12/19/18 110/60          Results for orders placed or performed in visit on 06/26/19   POCT glycosylated hemoglobin (Hb A1C)   Result Value Ref Range    Hemoglobin A1C 7.4 %     The ASCVD Risk score (Ava Wilks, et al., 2013) failed to calculate for the following reasons:    Cannot find a previous total cholesterol lab  Lab Review   Orders Only on 05/06/2019   Component Date Value    Sodium 05/06/2019 140     Potassium 05/06/2019 4.0     Chloride 05/06/2019 108*    CO2 05/06/2019 27     Anion Gap 05/06/2019 5*    BUN 05/06/2019 20*    Creatinine + eGFR Panel 05/06/2019 1.1     Glucose 05/06/2019 122*    Calcium 05/06/2019 9.1     Albumin,Serum 05/06/2019 3.5     Phosphorus 05/06/2019 3.0     GFR  05/06/2019 58     GFR Non- 05/06/2019 48    Office Visit on 03/28/2019   Component Date Value    Hemoglobin A1C 03/28/2019 7.5    Abstract on 02/12/2019   Component Date Value    Glucose 02/02/2019 149     BUN 02/02/2019 29     CREATININE 02/02/2019 1.2     Sodium 02/02/2019 136     Potassium 02/02/2019 4.7     Chloride 02/02/2019 104     CO2 02/02/2019 27     Calcium 02/02/2019 9.6     Phosphorus 02/02/2019 3.9     Alb 02/02/2019 3.9    Orders Only on 02/02/2019   Component Date Value    Sodium 02/02/2019 136     Potassium 02/02/2019 4.7     Chloride 02/02/2019 104     CO2 02/02/2019 27     Anion Gap 02/02/2019 5*    BUN 02/02/2019 29*    Creatinine + eGFR Panel 02/02/2019 1.2     Glucose 02/02/2019 149*    Calcium 02/02/2019 9.6     Albumin,Serum 02/02/2019 3.9     Phosphorus 02/02/2019 3.4     GFR  02/02/2019 53     GFR Non- 02/02/2019 44    Abstract on 01/29/2019   Component Date Value    Glucose 01/24/2019 209     BUN 01/24/2019 29     CREATININE 01/24/2019 1.7     Sodium 01/24/2019 134     Potassium 01/24/2019 5.7     Chloride 01/24/2019 101     CO2 01/24/2019 24     Calcium 01/24/2019 9.8     Phosphorus 01/24/2019 4.1     Alb 01/24/2019 4.2            Assessment:       Diagnosis Orders   1. Type 2 diabetes mellitus with microalbuminuria, without long-term current use of insulin (HCC)  POCT glycosylated hemoglobin (Hb A1C)    LIPID PANEL    COMPREHENSIVE METABOLIC PANEL   2. COPD, severe ([address]     3. Hyperlipidemia, unspecified hyperlipidemia type  LIPID PANEL    COMPREHENSIVE METABOLIC PANEL   4. Raynaud's disease without gangrene     5. Hypothyroidism, unspecified type  TSH with Reflex           Plan:      Has severe COPD. Continues seeing a lung doctor.   She has been told multiple times to quit smoking. Continue with oxygen. Hypothyroid stable. Continue current medication. Recheck labs today    Diabetes stable she is to take her Precose when she has a sugar over 180. Patient with Raynauds however the SITA that was checked was normal.    Recheck in 6 months.

## 2019-06-26 NOTE — PATIENT INSTRUCTIONS
Walking for Exercise: Care Instructions  Your Care Instructions    Walking is one of the easiest ways to get the exercise you need for good health. A brisk, 30-minute walk each day can help you feel better and have more energy. It can help you lower your risk of disease. Walking can help you keep your bones strong and your heart healthy. Check with your doctor before you start a walking plan if you have heart problems, other health issues, or you have not been active in a long time. Follow your doctor's instructions for safe levels of exercise. Follow-up care is a key part of your treatment and safety. Be sure to make and go to all appointments, and call your doctor if you are having problems. It's also a good idea to know your test results and keep a list of the medicines you take. How can you care for yourself at home? Getting started  · Start slowly and set a short-term goal. For example, walk for 5 or 10 minutes every day. · Bit by bit, increase the amount you walk every day. Try for at least 30 minutes on most days of the week. You also may want to swim, bike, or do other activities. · If finding enough time is a problem, it is fine to be active in blocks of 10 minutes or more throughout your day and week. · To get the heart-healthy benefits of walking, you need to walk briskly enough to increase your heart rate and breathing, but not so fast that you cannot talk comfortably. · Wear comfortable shoes that fit well and provide good support for your feet and ankles. Staying with your plan  · After you've made walking a habit, set a longer-term goal. You may want to set a goal of walking briskly for longer or walking farther. Experts say to do 2½ hours of moderate activity a week. A faster heartbeat is what defines moderate-level activity. · To stay motivated, walk with friends, coworkers, or pets. · Use a phone carlie or pedometer to track your steps each day. Set a goal to increase your steps.  Once you get there, set a higher goal. Aim for 10,000 steps a day. · If the weather keeps you from walking outside, go for walks at the mall with a friend. Local schools and churches may have indoor gyms where you can walk. Fitting a walk into your workday  · Park several blocks away from work, or get off the bus a few stops early. · Use the stairs instead of the elevator, at least for a few floors. · Suggest holding meetings with colleagues during a walk inside or outside the building. · Use the restroom that is the farthest from your desk or workstation. · Use your morning and afternoon breaks to take quick 15-minute walks. Staying safe  · Know your surroundings. Walk in a well-lighted, safe place. If it is dark, walk with a partner. Wear light-colored clothing. If you can, buy a vest or jacket that reflects light. · Carry a cell phone for emergencies. · Drink plenty of water. Take a water bottle with you when you walk. This is very important if it is hot out. · Be careful not to slip on wet or icy ground. You can buy \"grippers\" for your shoes to help keep you from slipping. · Pay attention to your walking surface. Use sidewalks and paths. · If you have breathing problems like asthma or COPD, ask your doctor when it is safe for you to walk outdoors. Cold, dry air, smog, pollen, or other things in the air could cause breathing problems. Where can you learn more? Go to https://Labs on the [employer] org and sign in to your [employer] account. Enter R159 in the Roller box to learn more about \"Walking for Exercise: Care Instructions. \"     If you do not have an account, please click on the \"Sign Up Now\" link. Current as of: August 19, 2018  Content Version: 12.0  © 7802-0827 [employer], [employer]. Care instructions adapted under license by [facility] ([facility]).  If you have questions about a medical condition or this instruction, always ask your healthcare professional. Christy Sapp disclaims any warranty or liability for your use of this information.

## 2019-06-27 ASSESSMENT — ENCOUNTER SYMPTOMS
SHORTNESS OF BREATH: 1
COUGH: 1

## 2019-06-27 ASSESSMENT — COPD QUESTIONNAIRES: COPD: 1

## 2019-06-29 DIAGNOSIS — E78.5 HYPERLIPIDEMIA, UNSPECIFIED HYPERLIPIDEMIA TYPE: ICD-10-CM

## 2019-07-01 RX ORDER — ATORVASTATIN CALCIUM 20 MG/1
TABLET, FILM COATED ORAL
Qty: 90 TABLET | Refills: 1 | Status: SHIPPED | OUTPATIENT
Start: 2019-07-01 | End: 2019-12-23 | Stop reason: SDUPTHER

## 2019-12-18 ENCOUNTER — OFFICE VISIT (OUTPATIENT)
Dept: FAMILY MEDICINE CLINIC | Age: 79
End: 2019-12-18
Payer: MEDICARE

## 2019-12-18 VITALS
WEIGHT: 158.6 LBS | BODY MASS INDEX: 26.39 KG/M2 | DIASTOLIC BLOOD PRESSURE: 60 MMHG | SYSTOLIC BLOOD PRESSURE: 110 MMHG | HEART RATE: 73 BPM

## 2019-12-18 DIAGNOSIS — R80.9 TYPE 2 DIABETES MELLITUS WITH MICROALBUMINURIA, WITHOUT LONG-TERM CURRENT USE OF INSULIN (HCC): Primary | ICD-10-CM

## 2019-12-18 DIAGNOSIS — E11.29 TYPE 2 DIABETES MELLITUS WITH MICROALBUMINURIA, WITHOUT LONG-TERM CURRENT USE OF INSULIN (HCC): Primary | ICD-10-CM

## 2019-12-18 DIAGNOSIS — E03.9 HYPOTHYROIDISM, UNSPECIFIED TYPE: ICD-10-CM

## 2019-12-18 DIAGNOSIS — F05 ACUTE CONFUSIONAL STATE: ICD-10-CM

## 2019-12-18 DIAGNOSIS — F41.9 ANXIETY: ICD-10-CM

## 2019-12-18 LAB
CREATININE URINE: 36.7 MG/DL (ref 28–259)
HBA1C MFR BLD: 7.5 %
MICROALBUMIN UR-MCNC: 1.3 MG/DL
MICROALBUMIN/CREAT UR-RTO: 35.4 MG/G (ref 0–30)
TSH REFLEX: 0.9 UIU/ML (ref 0.27–4.2)

## 2019-12-18 PROCEDURE — 83036 HEMOGLOBIN GLYCOSYLATED A1C: CPT | Performed by: FAMILY MEDICINE

## 2019-12-18 PROCEDURE — 36415 COLL VENOUS BLD VENIPUNCTURE: CPT | Performed by: FAMILY MEDICINE

## 2019-12-18 PROCEDURE — 99214 OFFICE O/P EST MOD 30 MIN: CPT | Performed by: FAMILY MEDICINE

## 2019-12-18 RX ORDER — LOSARTAN POTASSIUM 100 MG/1
100 TABLET ORAL DAILY
COMMUNITY
End: 2019-12-18

## 2019-12-18 RX ORDER — DILTIAZEM HYDROCHLORIDE 120 MG/1
120 CAPSULE, EXTENDED RELEASE ORAL DAILY
COMMUNITY
End: 2020-08-18

## 2019-12-18 RX ORDER — DILTIAZEM HYDROCHLORIDE 120 MG/1
120 CAPSULE, EXTENDED RELEASE ORAL 2 TIMES DAILY
COMMUNITY
End: 2019-12-18

## 2019-12-18 RX ORDER — LEVOTHYROXINE SODIUM 0.05 MG/1
50 TABLET ORAL DAILY
Qty: 30 TABLET | Refills: 11 | Status: SHIPPED | OUTPATIENT
Start: 2019-12-18

## 2019-12-18 RX ORDER — DILTIAZEM HYDROCHLORIDE 120 MG/1
120 CAPSULE, COATED, EXTENDED RELEASE ORAL DAILY
COMMUNITY
End: 2019-12-18

## 2019-12-18 RX ORDER — LOSARTAN POTASSIUM AND HYDROCHLOROTHIAZIDE 25; 100 MG/1; MG/1
1 TABLET ORAL DAILY
COMMUNITY
End: 2020-08-18

## 2019-12-18 ASSESSMENT — COPD QUESTIONNAIRES: COPD: 1

## 2019-12-18 ASSESSMENT — ENCOUNTER SYMPTOMS
COUGH: 1
SHORTNESS OF BREATH: 1

## 2019-12-23 ENCOUNTER — TELEPHONE (OUTPATIENT)
Dept: FAMILY MEDICINE CLINIC | Age: 79
End: 2019-12-23

## 2019-12-23 RX ORDER — ATORVASTATIN CALCIUM 20 MG/1
TABLET, FILM COATED ORAL
Qty: 90 TABLET | Refills: 1 | Status: SHIPPED | OUTPATIENT
Start: 2019-12-23 | End: 2020-06-29 | Stop reason: SDUPTHER

## 2019-12-23 NOTE — TELEPHONE ENCOUNTER
Patient called asking for a refill on Atorvastatin to be sent to [facility] [address] [place] [address].

## 2019-12-30 ENCOUNTER — OFFICE VISIT (OUTPATIENT)
Dept: FAMILY MEDICINE CLINIC | Age: 79
End: 2019-12-30
Payer: MEDICARE

## 2019-12-30 VITALS
HEIGHT: 65 IN | RESPIRATION RATE: 15 BRPM | WEIGHT: 159.8 LBS | HEART RATE: 88 BPM | SYSTOLIC BLOOD PRESSURE: 120 MMHG | OXYGEN SATURATION: 95 % | DIASTOLIC BLOOD PRESSURE: 80 MMHG | BODY MASS INDEX: 26.62 KG/M2

## 2019-12-30 DIAGNOSIS — F44.89 CONFUSION STATE: ICD-10-CM

## 2019-12-30 DIAGNOSIS — H53.9 VISION CHANGES: ICD-10-CM

## 2019-12-30 DIAGNOSIS — I10 ESSENTIAL HYPERTENSION: ICD-10-CM

## 2019-12-30 DIAGNOSIS — H53.33: ICD-10-CM

## 2019-12-30 DIAGNOSIS — Z91.89 DRIVING SAFETY ISSUE: Primary | ICD-10-CM

## 2019-12-30 PROCEDURE — 99213 OFFICE O/P EST LOW 20 MIN: CPT | Performed by: FAMILY MEDICINE

## 2020-01-14 ENCOUNTER — TELEPHONE (OUTPATIENT)
Dept: FAMILY MEDICINE CLINIC | Age: 80
End: 2020-01-14

## 2020-03-26 ENCOUNTER — TELEPHONE (OUTPATIENT)
Dept: FAMILY MEDICINE CLINIC | Age: 80
End: 2020-03-26

## 2020-04-02 ENCOUNTER — TELEPHONE (OUTPATIENT)
Dept: FAMILY MEDICINE CLINIC | Age: 80
End: 2020-04-02

## 2020-04-03 ENCOUNTER — TELEPHONE (OUTPATIENT)
Dept: FAMILY MEDICINE CLINIC | Age: 80
End: 2020-04-03

## 2020-04-09 ENCOUNTER — TELEPHONE (OUTPATIENT)
Dept: FAMILY MEDICINE CLINIC | Age: 80
End: 2020-04-09

## 2020-04-09 NOTE — TELEPHONE ENCOUNTER
Let patient know that I have attempted to call her eye doctor multiple times. They are not answering the phone. They may be closed for the Covid 19 pandemic. Therefore, I'm asking her to call them; once they are open again, I'm asking her to have them send me over her last note so that I can fill out her [address] paperwork.

## 2020-04-10 NOTE — TELEPHONE ENCOUNTER
Patient has been notified and state she was able to get it extended to the end of May and will contact her eye Dr to have them send over the office notes.

## 2020-04-20 RX ORDER — ACARBOSE 50 MG/1
50 TABLET ORAL 3 TIMES DAILY PRN
Qty: 90 TABLET | Refills: 3 | Status: SHIPPED | OUTPATIENT
Start: 2020-04-20

## 2020-06-10 ENCOUNTER — OFFICE VISIT (OUTPATIENT)
Dept: FAMILY MEDICINE CLINIC | Age: 80
End: 2020-06-10
Payer: MEDICARE

## 2020-06-10 VITALS
HEART RATE: 72 BPM | BODY MASS INDEX: 26.06 KG/M2 | OXYGEN SATURATION: 93 % | WEIGHT: 156.6 LBS | DIASTOLIC BLOOD PRESSURE: 80 MMHG | TEMPERATURE: 97.3 F | SYSTOLIC BLOOD PRESSURE: 140 MMHG

## 2020-06-10 LAB — HBA1C MFR BLD: 9.2 %

## 2020-06-10 PROCEDURE — 99214 OFFICE O/P EST MOD 30 MIN: CPT | Performed by: FAMILY MEDICINE

## 2020-06-10 PROCEDURE — 90715 TDAP VACCINE 7 YRS/> IM: CPT | Performed by: FAMILY MEDICINE

## 2020-06-10 PROCEDURE — 90471 IMMUNIZATION ADMIN: CPT | Performed by: FAMILY MEDICINE

## 2020-06-10 PROCEDURE — 83036 HEMOGLOBIN GLYCOSYLATED A1C: CPT | Performed by: FAMILY MEDICINE

## 2020-06-10 RX ORDER — GLIPIZIDE 10 MG/1
10 TABLET, FILM COATED, EXTENDED RELEASE ORAL DAILY
Qty: 30 TABLET | Refills: 0 | Status: SHIPPED | OUTPATIENT
Start: 2020-06-10 | End: 2020-06-29 | Stop reason: SDUPTHER

## 2020-06-10 RX ORDER — LANCETS 30 GAUGE
1 EACH MISCELLANEOUS 2 TIMES DAILY
Qty: 180 EACH | Refills: 3 | Status: SHIPPED | OUTPATIENT
Start: 2020-06-10 | End: 2020-09-08

## 2020-06-10 ASSESSMENT — ENCOUNTER SYMPTOMS
VOMITING: 0
DIARRHEA: 0
NAUSEA: 0
COUGH: 1
SHORTNESS OF BREATH: 1

## 2020-06-10 ASSESSMENT — COPD QUESTIONNAIRES: COPD: 1

## 2020-06-10 NOTE — PATIENT INSTRUCTIONS
elephant trunk). Then guide it in circles that start small (about the size of a dinner plate). Make the circles a bit larger each day, as your pain allows. 3. Do this exercise for 5 minutes, 5 to 7 times each day. 4. As you have less pain, try bending over a little farther to do this exercise. This will increase the amount of movement at your shoulder. Posterior stretching exercise   1. Hold the elbow of your injured arm with your other hand. 2. Use your hand to pull your injured arm gently up and across your body. You will feel a gentle stretch across the back of your injured shoulder. 3. Hold for at least 15 to 30 seconds. Then slowly lower your arm. 4. Repeat 2 to 4 times. Up-the-back stretch   Your doctor or physical therapist may want you to wait to do this stretch until you have regained most of your range of motion and strength. You can do this stretch in different ways. Hold any of these stretches for at least 15 to 30 seconds. Repeat them 2 to 4 times. 1. Light stretch: Put your hand in your back pocket. Let it rest there to stretch your shoulder. 2. Moderate stretch: With your other hand, hold your injured arm (palm outward) behind your back by the wrist. Pull your arm up gently to stretch your shoulder. 3. Advanced stretch: Put a towel over your other shoulder. Put the hand of your injured arm behind your back. Now hold the back end of the towel. With the other hand, hold the front end of the towel in front of your body. Pull gently on the front end of the towel. This will bring your hand farther up your back to stretch your shoulder. Overhead stretch   1. Standing about an arm's length away, grasp onto a solid surface. You could use a countertop, a doorknob, or the back of a sturdy chair. 2. With your knees slightly bent, bend forward with your arms straight. Lower your upper body, and let your shoulders stretch.   3. As your shoulders are able to stretch farther, you may need to take a activities. Symptoms went away on their own in about 2 to 3 days. Side effects were more common in younger people. You should still get the second dose of recombinant zoster vaccine even if you had one of these reactions after the first dose. Other things that could happen after this vaccine:  · People sometimes faint after medical procedures, including vaccination. Sitting or lying down for about 15 minutes can help prevent fainting and injuries caused by a fall. Tell your provider if you feel dizzy or have vision changes or ringing in the ears. · Some people get shoulder pain that can be more severe and longer-lasting than routine soreness that can follow injections. This happens very rarely. · Any medication can cause a severe allergic reaction. Such reactions to a vaccine are estimated at about 1 in a million doses, and would happen within a few minutes to a few hours after the vaccination. As with any medicine, there is a very remote chance of a vaccine causing a serious injury or death. The safety of vaccines is always being monitored. For more information, visit: www.cdc.gov/vaccinesafety/  What if there is a serious problem? What should I look for? · Look for anything that concerns you, such as signs of a severe allergic reaction, very high fever, or unusual behavior. Signs of a severe allergic reaction can include hives, swelling of the face and throat, difficulty breathing, a fast heartbeat, dizziness, and weakness. These would usually start a few minutes to a few hours after the vaccination. What should I do? · If you think it is a severe allergic reaction or other emergency that can't wait, call 9-1-1 or get to the nearest hospital. Otherwise, call your health care provider. · Afterward, the reaction should be reported to the Vaccine Adverse Event Reporting System (VAERS). Your doctor should file this report, or you can do it yourself through the VAERS website at www.vaers. hhs.gov, or by

## 2020-06-10 NOTE — PROGRESS NOTES
Additional symptoms of the illness do not include headaches. Shoulder Pain    Pertinent negatives include no fever. Review of Systems   Constitutional: Negative for chills, diaphoresis and fever. HENT:        No loss of taste or smell sensation   Respiratory: Positive for cough and shortness of breath. Cardiovascular: Negative for chest pain and palpitations. Gastrointestinal: Negative for diarrhea, nausea and vomiting. Musculoskeletal: Negative for myalgias. Neurological: Negative for headaches. Psychiatric/Behavioral: The patient is not nervous/anxious.         Patient Active Problem List   Diagnosis    Hyperlipidemia    Raynaud's disease    Hypothyroidism    Type 2 diabetes mellitus with microalbuminuria ([address]    Osteopenia    COPD, severe ([address]    Essential hypertension    Dusky feet       Past Surgical History:   Procedure Laterality Date    CARDIOVASCULAR STRESS TEST  11/2014    negative    CARDIOVASCULAR STRESS TEST  12/14/2017    COLONOSCOPY  10/20/2011    diverticulosis, rectal polyp,internal hemorrhoids; repeat colonoscopy in 2016   [address]  2004    TOE DEBRIDEMENT  8/2011    Right foot, fourth toe infection - S/P I & D - Dr Jenna Mendoza GASTROINTESTINAL ENDOSCOPY  08/24/2017       Family History   Problem Relation Age of Onset    Cancer Mother         Leukemia    Diabetes Father         Type 2    Diabetes Son        Current Outpatient Medications on File Prior to Visit   Medication Sig Dispense Refill    acarbose (PRECOSE) 50 MG tablet Take 1 tablet by mouth 3 times daily as needed (sugar over 200) 90 tablet 3    atorvastatin (LIPITOR) 20 MG tablet TAKE 1 TABLET BY MOUTH ONCE DAILY 90 tablet 1    diltiazem (DILACOR XR) 120 MG extended release capsule Take 120 mg by mouth daily      losartan-hydrochlorothiazide (HYZAAR) 100-25 MG per tablet Take 1 tablet by mouth daily      aspirin 325 MG EC tablet Take 1 tablet by mouth daily 90 tablet 3    sertraline (ZOLOFT) 50 MG tablet Take 1 tablet by mouth daily 90 tablet 3    levothyroxine (SYNTHROID) 50 MCG tablet Take 1 tablet by mouth daily As directed 30 tablet 11    blood glucose test strips (ONE TOUCH ULTRA TEST) strip Twice daily 100 each 11    OXYGEN 2 L nightly      albuterol sulfate HFA (PROAIR HFA) 108 (90 Base) MCG/ACT inhaler Take 2 puffs by mouth every 6 hours as needed      albuterol (PROVENTIL) (2.5 MG/3ML) 0.083% nebulizer solution 3 mLs 4 times daily as needed   12    magnesium (MAGNESIUM-OXIDE) 250 MG TABS tablet Take 250 mg by mouth daily      Omega 3 1000 MG CAPS Take 1 capsule by mouth 2 times daily      Probiotic Product (PROBIOTIC COLON SUPPORT PO) Take 2 tablets by mouth daily      glucose monitoring kit (FREESTYLE) monitoring kit 1 kit by Does not apply route daily Dispense whatever brand is covered by insurance 1 kit 0    ANORO ELLIPTA 62.5-25 MCG/INH AEPB inhaler Inhale 1 puff into the lungs daily  6    predniSONE (DELTASONE) 10 MG tablet Take 1 tablet by mouth      ipratropium-albuterol (DUONEB) 0.5-2.5 (3) MG/3ML SOLN nebulizer solution Inhale 1 vial into the lungs 3 times daily  5    labetalol (NORMODYNE) 100 MG tablet Take 100 mg by mouth every morning      labetalol (NORMODYNE) 200 MG tablet Take 200 mg by mouth 2 times daily       No current facility-administered medications on file prior to visit. Objective:         Physical Exam  Vitals signs and nursing note reviewed. Constitutional:       Appearance: She is well-developed. HENT:      Head: Normocephalic and atraumatic. Neck:      Musculoskeletal: Neck supple. Cardiovascular:      Rate and Rhythm: Normal rate and regular rhythm. Heart sounds: Normal heart sounds, S1 normal and S2 normal.   Pulmonary:      Effort: Pulmonary effort is normal. No respiratory distress. Breath sounds: Decreased air movement present.  Decreased breath sounds and rhonchi present. No wheezing. Skin:     General: Skin is warm and dry. Neurological:      Mental Status: She is alert. Vitals:    06/10/20 1322 06/10/20 1327   BP: (!) 140/80 (!) 140/80   Site: Left Upper Arm    Position: Sitting    Cuff Size: Medium Adult    Pulse: 72    Temp: 97.3 °F (36.3 °C)    SpO2: 93%    Weight: 156 lb 9.6 oz (71 kg)      Body mass index is 26.06 kg/m². Wt Readings from Last 3 Encounters:   06/10/20 156 lb 9.6 oz (71 kg)   12/30/19 159 lb 12.8 oz (72.5 kg)   12/18/19 158 lb 9.6 oz (71.9 kg)     BP Readings from Last 3 Encounters:   06/10/20 (!) 140/80   12/30/19 120/80   12/18/19 110/60          Results for orders placed or performed in visit on 06/10/20   POCT glycosylated hemoglobin (Hb A1C)   Result Value Ref Range    Hemoglobin A1C 9.2 %     The ASCVD Risk score (Murray Billy, et al., 2013) failed to calculate for the following reasons:    Cannot find a previous total cholesterol lab  Lab Review   Orders Only on 03/02/2020   Component Date Value    Sodium 03/02/2020 136     Potassium 03/02/2020 3.6     Chloride 03/02/2020 100     CO2 03/02/2020 29     Anion Gap 03/02/2020 7     BUN 03/02/2020 23*    Creatinine + eGFR Panel 03/02/2020 1.2     Glucose 03/02/2020 254*    Calcium 03/02/2020 9.5     Albumin,Serum 03/02/2020 4.2     Phosphorus 03/02/2020 3.2     GFR  03/02/2020 53     GFR Non- 03/02/2020 43     PTH 03/02/2020 62.2    Office Visit on 12/18/2019   Component Date Value    Hemoglobin A1C 12/18/2019 7.5     Microalbumin, Random Uri* 12/18/2019 1.30     Creatinine, Ur 12/18/2019 36.7     Microalbumin Creatinine * 12/18/2019 35.4*    TSH 12/18/2019 0.90            Assessment:       Diagnosis Orders   1. Type 2 diabetes mellitus with microalbuminuria, without long-term current use of insulin (HCC)  POCT glycosylated hemoglobin (Hb A1C)    glipiZIDE (GLUCOTROL XL) 10 MG extended release tablet    Lancets MISC   2.  Need for tetanus booster     3. Need for shingles vaccine     4. Post-menopausal     5. COPD, severe ([address]     6. Hypothyroidism, unspecified type       Recommended that she get the shingles vaccine    Recommend that she get her bone density. She declined. Explained to her that it could detect osteoporosis. She runs the risk of having a fracture and ending up in the nursing home. Again she declined. Diabetes uncontrolled. Adding on glipizide and recheck in 3 months. Take the acarbose 3 times a day    Blood pressure borderline. Blood pressure medicines managed by her nephrologist.  Encouraged her to follow-up with nephrology        Plan:            Return in about 12 weeks (around 9/2/2020), or parking lot A1c, for DM, Telephone visit.

## 2020-06-29 ENCOUNTER — TELEPHONE (OUTPATIENT)
Dept: FAMILY MEDICINE CLINIC | Age: 80
End: 2020-06-29

## 2020-06-29 RX ORDER — ATORVASTATIN CALCIUM 20 MG/1
TABLET, FILM COATED ORAL
Qty: 90 TABLET | Refills: 3 | Status: SHIPPED | OUTPATIENT
Start: 2020-06-29

## 2020-06-29 RX ORDER — GLIPIZIDE 10 MG/1
10 TABLET, FILM COATED, EXTENDED RELEASE ORAL DAILY
Qty: 90 TABLET | Refills: 3 | Status: SHIPPED | OUTPATIENT
Start: 2020-06-29 | End: 2020-08-18

## 2020-07-09 ENCOUNTER — TELEPHONE (OUTPATIENT)
Dept: FAMILY MEDICINE CLINIC | Age: 80
End: 2020-07-09

## 2020-07-09 NOTE — TELEPHONE ENCOUNTER
Pierre Quintero from [facility] called stating that the patient is in the hospital, when it is time for the patient to be discharge the patient is requesting home care. Will you follow?  Please advise

## 2020-07-15 ENCOUNTER — TELEPHONE (OUTPATIENT)
Dept: FAMILY MEDICINE CLINIC | Age: 80
End: 2020-07-15

## 2020-08-10 ENCOUNTER — TELEPHONE (OUTPATIENT)
Dept: FAMILY MEDICINE CLINIC | Age: 80
End: 2020-08-10

## 2020-08-18 RX ORDER — UMECLIDINIUM BROMIDE AND VILANTEROL TRIFENATATE 62.5; 25 UG/1; UG/1
POWDER RESPIRATORY (INHALATION)
COMMUNITY

## 2020-08-18 RX ORDER — METHOCARBAMOL 500 MG/1
500 TABLET, FILM COATED ORAL 3 TIMES DAILY
COMMUNITY
End: 2020-09-08 | Stop reason: SDUPTHER

## 2020-08-18 RX ORDER — ACETAMINOPHEN 500 MG
500 TABLET ORAL EVERY 12 HOURS PRN
COMMUNITY

## 2020-08-18 RX ORDER — GABAPENTIN 400 MG/1
400 CAPSULE ORAL 3 TIMES DAILY
COMMUNITY
End: 2020-08-31 | Stop reason: SDUPTHER

## 2020-08-18 RX ORDER — SENNA AND DOCUSATE SODIUM 50; 8.6 MG/1; MG/1
1 TABLET, FILM COATED ORAL DAILY
COMMUNITY

## 2020-08-18 RX ORDER — INSULIN GLARGINE 100 [IU]/ML
INJECTION, SOLUTION SUBCUTANEOUS NIGHTLY
COMMUNITY

## 2020-08-18 RX ORDER — AZITHROMYCIN 250 MG/1
TABLET, FILM COATED ORAL
COMMUNITY

## 2020-08-18 RX ORDER — LOSARTAN POTASSIUM 100 MG/1
100 TABLET ORAL DAILY
COMMUNITY
End: 2020-09-08 | Stop reason: SDUPTHER

## 2020-08-18 RX ORDER — ASPIRIN 81 MG/1
81 TABLET ORAL DAILY
COMMUNITY

## 2020-08-19 ENCOUNTER — VIRTUAL VISIT (OUTPATIENT)
Dept: FAMILY MEDICINE CLINIC | Age: 80
End: 2020-08-19
Payer: MEDICARE

## 2020-08-19 PROBLEM — Z51.5 PALLIATIVE CARE PATIENT: Status: ACTIVE | Noted: 2020-08-19

## 2020-08-19 PROCEDURE — 99214 OFFICE O/P EST MOD 30 MIN: CPT | Performed by: FAMILY MEDICINE

## 2020-08-19 ASSESSMENT — ENCOUNTER SYMPTOMS
COUGH: 1
SHORTNESS OF BREATH: 1
CHEST TIGHTNESS: 1

## 2020-08-19 NOTE — PROGRESS NOTES
2020    TELEHEALTH EVALUATION -- Audio/Visual (During DZUYP-69 public health emergency)    HPI:    Elva Parrish (:  1940) has requested an audio/video evaluation for the following concern(s):    Metastatic pancreatic cancer: It has spread to her liver. She recently had to have an amputation of her leg due to complications. She is asking for referral to palliative care. Diabetes   She presents for her follow-up diabetic visit. She has type 2 diabetes mellitus. Her disease course has been stable. Pertinent negatives for hypoglycemia include no headaches or nervousness/anxiousness. Pertinent negatives for diabetes include no chest pain. Symptoms are stable. Risk factors for coronary artery disease include hypertension and tobacco exposure. When asked about current treatments, none were reported. Her overall blood glucose range is 120-180 mg/dl. Hypertension   This is a chronic problem. The current episode started more than 1 year ago. On 2 medications    COPD   She complains of cough and shortness of breath. Primary symptoms comments: Sees pulmonologist in [facility]. This is a chronic problem. The current episode started more than 1 year ago. The problem occurs daily. The problem has been unchanged. Review of Systems   Constitutional: Positive for activity change. Negative for unexpected weight change. Respiratory: Positive for cough, chest tightness and shortness of breath. Psychiatric/Behavioral: Negative. Prior to Visit Medications    Medication Sig Taking?  Authorizing Provider   aspirin 81 MG EC tablet Take 81 mg by mouth daily Yes Historical Provider, MD   losartan (COZAAR) 100 MG tablet Take 100 mg by mouth daily Yes Historical Provider, MD   umeclidinium-vilanterol (ANORO ELLIPTA) 62.5-25 MCG/INH AEPB inhaler 1 puff Yes Historical Provider, MD   azithromycin (ZITHROMAX) 250 MG tablet 1 tablet mon/wed/fri Yes Historical Provider, MD   apixaban (ELIQUIS) 5 MG TABS tablet Take by mouth 2 times daily Yes Historical Provider, MD   acetaminophen (TYLENOL) 500 MG tablet Take 500 mg by mouth every 12 hours as needed for Pain Yes Historical Provider, MD   insulin glargine (LANTUS) 100 UNIT/ML injection vial Inject into the skin nightly 7 units in the morning, 10 units at bedtime Yes Historical Provider, MD   methocarbamol (ROBAXIN) 500 MG tablet Take 500 mg by mouth 3 times daily Yes Historical Provider, MD   gabapentin (NEURONTIN) 400 MG capsule Take 400 mg by mouth 3 times daily.  Yes Historical Provider, MD   sennosides-docusate sodium (SENOKOT-S) 8.6-50 MG tablet Take 1 tablet by mouth daily Yes Historical Provider, MD   atorvastatin (LIPITOR) 20 MG tablet TAKE 1 TABLET BY MOUTH ONCE DAILY Yes Don Ayala MD   acarbose (PRECOSE) 50 MG tablet Take 1 tablet by mouth 3 times daily as needed (sugar over 200) Yes Don Ayala MD   sertraline (ZOLOFT) 50 MG tablet Take 1 tablet by mouth daily Yes Don Ayala MD   levothyroxine (SYNTHROID) 50 MCG tablet Take 1 tablet by mouth daily As directed Yes Don Ayala MD   OXYGEN 2 L nightly Yes Clearance MD Chani   albuterol sulfate HFA (PROAIR HFA) 108 (90 Base) MCG/ACT inhaler Take 2 puffs by mouth every 6 hours as needed Yes China Wilde MD   labetalol (NORMODYNE) 200 MG tablet Take 200 mg by mouth 2 times daily Yes Veronica Limb   predniSONE (DELTASONE) 10 MG tablet Take 1 tablet by mouth Yes China Wilde MD   ipratropium-albuterol (DUONEB) 0.5-2.5 (3) MG/3ML SOLN nebulizer solution Inhale 1 vial into the lungs 3 times daily Yes Katia Serve   Lancets MISC 1 each by Does not apply route 2 times daily for 180 doses  Don Ayala MD   blood glucose test strips (ONE TOUCH ULTRA TEST) strip Twice daily  Don Ayala MD   albuterol (PROVENTIL) (2.5 MG/3ML) 0.083% nebulizer solution 3 mLs 4 times daily as needed   Historical Provider, MD   magnesium (MAGNESIUM-OXIDE) 250 MG TABS tablet Take 250 mg by mouth daily Historical Provider, MD   [place] 3 1000 MG CAPS Take 1 capsule by mouth daily   Historical Provider, MD   Probiotic Product (PROBIOTIC COLON SUPPORT PO) Take 2 tablets by mouth daily  Historical Provider, MD   glucose monitoring kit (FREESTYLE) monitoring kit 1 kit by Does not apply route daily Dispense whatever brand is covered by insurance  Antonio Flores MD   ANORO ELLIPTA 62.5-25 MCG/INH AEPB inhaler Inhale 1 puff into the lungs daily  Naheed Moralesing       Social History     Tobacco Use    Smoking status: Former Smoker     Packs/day: 1.00     Years: 20.00     Pack years: 20.00     Types: Cigarettes     Last attempt to quit: 1999     Years since quittin.6    Smokeless tobacco: Never Used   Substance Use Topics    Alcohol use: No     Comment: Rarely    Drug use: No        Allergies   Allergen Reactions    Cipro Xr Nausea Only    Imdur [Isosorbide Dinitrate]     Lipitor      Pt states she takes and tolerates RT, NP 18    Lisinopril Swelling     Lip swelling    Metformin And Related Other (See Comments)     Creatinine elevation    Norvasc [Amlodipine Besylate]     Roflumilast      Due to pancreattitis    Sulfamethoxazole-Trimethoprim Nausea Only    Zocor [Simvastatin]     Amlodipine Palpitations    Procardia Xl [Nifedipine] Palpitations   ,   Past Medical History:   Diagnosis Date    Cataract     Chronic low back pain     Colon polyps     hyperplastic rectal polyp- dx'd in -repeat colonoscopy in 2016    COPD (chronic obstructive pulmonary disease) (HCC)     COPD, severe ([address] 2015    Diverticulosis of colon 10/2011    moderately severe of descending colon and sigmoid colon    Hyperlipidemia     mixed    Hypertension     essential    Hypothyroidism dx'd     acquired    Neuropathy     peripheral neuopathy    Osteoarthritis     affecting primarily the low back and hips; spinal stenosis    Osteopenia 6/15/2015    Peripheral vascular disease ([address]     dx'd in     Raynaud's disease     Screening for cardiovascular condition 5/2008    Stress Test incomplete    Spinal stenosis     Type II or unspecified type diabetes mellitus without mention of complication, not stated as uncontrolled     dx'd in 9352; complications include peripheral  neuropathy       PHYSICAL EXAMINATION:  [ INSTRUCTIONS:  \"[x]\" Indicates a positive item  \"[]\" Indicates a negative item  -- DELETE ALL ITEMS NOT EXAMINED]  Vital Signs: (As obtained by patient/caregiver or practitioner observation)    Blood pressure-  Heart rate-    Respiratory rate-    Temperature-  Pulse oximetry-     Constitutional: [] Appears well-developed and well-nourished [] No apparent distress      [x] Abnormal- appears pale and ill  Mental status  [x] Alert and awake  [x] Oriented to person/place/time []Able to follow commands      Eyes:  EOM    []  Normal  [] Abnormal-  Sclera  []  Normal  [] Abnormal -         Discharge []  None visible  [] Abnormal -    HENT:   [x] Normocephalic, atraumatic. [] Abnormal   [] Mouth/Throat: Mucous membranes are moist.     External Ears [] Normal  [] Abnormal-     Neck: [x] No visualized mass     Pulmonary/Chest: [x] Respiratory effort normal.  [] No visualized signs of difficulty breathing or respiratory distress        [] Abnormal-      Musculoskeletal:   [] Normal gait with no signs of ataxia         [] Normal range of motion of neck        [] Abnormal-       Neurological:        [x] No Facial Asymmetry (Cranial nerve 7 motor function) (limited exam to video visit)          [] No gaze palsy        [] Abnormal-         Skin:        [] No significant exanthematous lesions or discoloration noted on facial skin         [x] Abnormal- diffuse bruises on her arms, constent with her prior visits, not suspicious         Psychiatric:       [x] Normal Affect [] No Hallucinations        [] Abnormal-     Other pertinent observable physical exam findings-    Diagnosis Orders   1.  Pancreatic cancer metastasized to liver [facility])  External Referral To Palliative Care   2. Palliative care patient     3. COPD, severe ([address]     4. Hypothyroidism, unspecified type     5. Type 2 diabetes mellitus with microalbuminuria, with long-term current use of insulin (HCC)       Wished her well. Will refer palliative care    Will hold off on any medication refills at this time  No follow-ups on file. Ted Rios is a 79 y.o. female being evaluated by a Virtual Visit (video visit) encounter to address concerns as mentioned above. A caregiver was present when appropriate. Due to this being a TeleHealth encounter (During JDAOT-83 public health emergency), evaluation of the following organ systems was limited: Vitals/Constitutional/EENT/Resp/CV/GI//MS/Neuro/Skin/Heme-Lymph-Imm. Pursuant to the emergency declaration under the [address] [place] [address] and the [employer] Act, this Virtual Visit was conducted with patient's (and/or legal guardian's) consent, to reduce the patient's risk of exposure to COVID-19 and provide necessary medical care. The patient (and/or legal guardian) has also been advised to contact this office for worsening conditions or problems, and seek emergency medical treatment and/or call 911 if deemed necessary. Patient identification was verified at the start of the visit: Yes    Total time spent on this encounter: Not billed by time    Services were provided through a video synchronous discussion virtually to substitute for in-person clinic visit. Patient and provider were located at their individual homes. --En Pro MD on 8/19/2020 at 1:15 PM    An electronic signature was used to authenticate this note.

## 2020-08-19 NOTE — PATIENT INSTRUCTIONS
October Fifth is the DEADLINE for voter registration for the November Third GENERAL ELECTION. Don't forget to vote!!!        [address] TO ALL APPOINTMENTS    The diagnoses and medications listed in this after visit summary may not be accurate at the time of check out. Please check MY CHART in 28-48 hours for possible corrections. Late cancellation policy: So that we can better accommodate people who are sick, please give our office 24 hour notice for an appointment cancellation. Thank you. Missed appointments: Your care is very important to us. It is important that you keep your scheduled appointments. Multiple missed appointments will lead to a dismissal from the office. Later arrival policy: If you are more than 10 minutes late for your appointment, you will be asked to reschedule. Please allow 5-7 business days for paperwork to be processed. It is important that you check your MY Chart messages, as they include appointment reminders, test results, and other important information. If you have forgotten your password, please call [phone].

## 2020-08-21 ENCOUNTER — TELEPHONE (OUTPATIENT)
Dept: FAMILY MEDICINE CLINIC | Age: 80
End: 2020-08-21

## 2020-08-21 NOTE — TELEPHONE ENCOUNTER
I have called palliative care there is a voicemail stating they do not check their messages I had to page them, waiting on a call back hopefully I hear from them today

## 2020-08-31 ENCOUNTER — TELEPHONE (OUTPATIENT)
Dept: FAMILY MEDICINE CLINIC | Age: 80
End: 2020-08-31

## 2020-08-31 RX ORDER — LABETALOL 200 MG/1
100 TABLET, FILM COATED ORAL 2 TIMES DAILY
Qty: 30 TABLET | Refills: 0 | Status: SHIPPED | OUTPATIENT
Start: 2020-08-31

## 2020-08-31 RX ORDER — GABAPENTIN 400 MG/1
400 CAPSULE ORAL 3 TIMES DAILY
Qty: 90 CAPSULE | Refills: 0 | Status: SHIPPED | OUTPATIENT
Start: 2020-08-31 | End: 2020-09-30

## 2020-08-31 NOTE — TELEPHONE ENCOUNTER
Please check with daughter. Patient is in palliative care. Usually all medications are addressed by the palliative care physician.   Something changed?    (as I was reviewing the schedule for Wednesday, I saw that she was on the schedule still and I had actually indicated it should be cancelled--please clarify with daughter and palliative care)

## 2020-08-31 NOTE — TELEPHONE ENCOUNTER
Patient's daughter called requesting refills of Gabapentin 400 mg TID, Eliquis 5 mg BID, Labetalol 200 mg take 0.5 tablet BID. Patient uses [facility]. Patient's daughter stated she will be out of these medications before her appointment.

## 2020-08-31 NOTE — TELEPHONE ENCOUNTER
Patient's daughter stated palliative care did not accept her and told her she needed hospice. Hospice came today and told her she is not ready for hospice yet. So she is not seeing palliative care or hospice. She would still need the appointment for Wednesday.

## 2020-09-08 ENCOUNTER — VIRTUAL VISIT (OUTPATIENT)
Dept: FAMILY MEDICINE CLINIC | Age: 80
End: 2020-09-08
Payer: MEDICARE

## 2020-09-08 ENCOUNTER — TELEPHONE (OUTPATIENT)
Dept: FAMILY MEDICINE CLINIC | Age: 80
End: 2020-09-08

## 2020-09-08 PROCEDURE — 99442 PR PHYS/QHP TELEPHONE EVALUATION 11-20 MIN: CPT | Performed by: FAMILY MEDICINE

## 2020-09-08 RX ORDER — METHOCARBAMOL 500 MG/1
500 TABLET, FILM COATED ORAL 3 TIMES DAILY
Qty: 90 TABLET | Refills: 1 | Status: SHIPPED | OUTPATIENT
Start: 2020-09-08

## 2020-09-08 RX ORDER — PREDNISONE 10 MG/1
10 TABLET ORAL DAILY
Qty: 90 TABLET | Refills: 3 | Status: SHIPPED | OUTPATIENT
Start: 2020-09-08

## 2020-09-08 RX ORDER — LOSARTAN POTASSIUM 100 MG/1
100 TABLET ORAL DAILY
Qty: 90 TABLET | Refills: 3 | Status: SHIPPED | OUTPATIENT
Start: 2020-09-08

## 2020-09-08 ASSESSMENT — PATIENT HEALTH QUESTIONNAIRE - PHQ9
SUM OF ALL RESPONSES TO PHQ QUESTIONS 1-9: 0
2. FEELING DOWN, DEPRESSED OR HOPELESS: 0
SUM OF ALL RESPONSES TO PHQ QUESTIONS 1-9: 0
SUM OF ALL RESPONSES TO PHQ9 QUESTIONS 1 & 2: 0
1. LITTLE INTEREST OR PLEASURE IN DOING THINGS: 0

## 2020-09-08 NOTE — LETTER
[address]  Phone: [phone]  Fax: [phone]    Babita Coelho MD          September 8, 2020         Patient: Harrold Siemens   MR Number: [id number]   YOB: 1940   Date of Visit: 9/8/2020       Dear Oncology:    I am referring my patient, Isabel Andres, to you for evaluation of stage 4 metastatic pancreatic cancer. Sherrie Chen was discovered to have metastatic pancreatic cancer in June 2020. Her records can be found through the [facility] records. Patient had initially agreed to hospice care. On discharge she changed her mind and wanted palliative care. However she was turned down for palliative care per patient. I conducted a virtual routine follow-up with Sherrie Chen today. She is asking to be referred to an oncologist so she can get her case reviewed. I have explained to her that stage IV pancreatic cancer is a very poor prognosis. She understands. She is adamantly refusing hospice care at this time. She  has a past medical history of Cataract, Chronic low back pain, Colon polyps, COPD (chronic obstructive pulmonary disease) (HCC), COPD, severe ([address] Diverticulosis of colon, Hyperlipidemia, Hypertension, Hypothyroidism, Neuropathy, Osteoarthritis, Osteopenia, Peripheral vascular disease ([address] Raynaud's disease, Screening for cardiovascular condition, Spinal stenosis, and Type II or unspecified type diabetes mellitus without mention of complication, not stated as uncontrolled. Her  has a past surgical history that includes Colonoscopy (10/20/2011); Hysterectomy; Rectocele repair (2004); Foot surgery; Toe Debridement (8/2011); cardiovascular stress test (11/2014); Upper gastrointestinal endoscopy (08/24/2017); and cardiovascular stress test (12/14/2017). She  reports that she quit smoking about 21 years ago.  Her smoking use included cigarettes. She has a 20.00 pack-year smoking history. She has never used smokeless tobacco. She reports that she does not drink alcohol or use drugs. She has a current medication list which includes the following prescription(s): gabapentin, apixaban, labetalol, aspirin, losartan, anoro ellipta, azithromycin, acetaminophen, insulin glargine, methocarbamol, sennosides-docusate sodium, atorvastatin, lancets, acarbose, sertraline, levothyroxine, blood glucose test strips, OXYGEN, albuterol sulfate hfa, albuterol, magnesium, omega 3, probiotic product, glucose monitoring kit, anoro ellipta, prednisone, and ipratropium-albuterol. She is allergic to cipro xr; imdur [isosorbide dinitrate]; lipitor; lisinopril; metformin and related; norvasc [amlodipine besylate]; roflumilast; sulfamethoxazole-trimethoprim; zocor [simvastatin]; amlodipine; and procardia xl [nifedipine]. I appreciate your assistance in her care and look forward to your findings and recommendations.     Sincerely,                             Ricarda Novoa MD

## 2020-09-08 NOTE — PROGRESS NOTES
Cady Ortiz is a 79 y.o. female evaluated via telephone on 9/8/2020. Consent:  She and/or health care decision maker is aware that that she may receive a bill for this telephone service, depending on her insurance coverage, and has provided verbal consent to proceed: Yes      Documentation:  I communicated with the patient and/or health care decision maker about       Metastatic pancreatic cancer: Was turned down by palliative care. They said she probably needed hospice. Patient definitely does not want to involve hospice. She wants to see an oncologist to see what other options there are for her. Hypertension: Well-controlled with the Normodyne and losartan. She would like to continue taking    Status post leg amputation: She is getting help at home she is now living with her daughter. She is asking for disability placard    COPD: She is doing okay. She is on oxygen she is also asking for refill of her prednisone. Diabetes: Sugars are well controlled. She is on the Lantus and takes 7 units of insulin morning and 10 units in the evening. She is also on Precose as needed her sugar above 200. Her sugars are between 101  And 140. She has not had to take the Precose very often  Details of this discussion including any medical advice provided:      Diagnosis Orders   1. Type 2 diabetes mellitus with microalbuminuria, with long-term current use of insulin ([address]     2. Pancreatic cancer metastasized to liver [facility])  Amb External Referral To Oncology    methocarbamol (ROBAXIN) 500 MG tablet    losartan (COZAAR) 100 MG tablet   3. Essential hypertension  losartan (COZAAR) 100 MG tablet   4. COPD, severe ([address]  predniSONE (DELTASONE) 10 MG tablet     I did explain to patient that her prognosis is poor. She understands. She is adamantly refusing hospice. Therefore I will send referral to oncologist through [facility]. Diabetes well controlled.   She can take all  of the Lantus at once.  So she can take 17 units tomorrow morning. I affirm this is a Patient Initiated Episode with a Patient who has not had a related appointment within my department in the past 7 days or scheduled within the next 24 hours. Patient identification was verified at the start of the visit: Yes    Total Time: minutes: 11-20 minutes  We will recheck her in 1 month.   Note: not billable if this call serves to triage the patient into an appointment for the relevant concern      En Pro

## 2020-09-08 NOTE — LETTER
[address] [facility]  [place] [address]  Phone: [phone]  Fax: [phone]    Tenisha Woodson MD        September 8, 2020     Patient: Laurie Damon   YOB: 1940   Date of Visit: 9/8/2020       To Whom It May Concern: It is my medical opinion that Scarlett Darby requires a disability parking placard for the following reasons:  She has limited walking ability due to an orthopedic condition. Duration of need: 5 years    If you have any questions or concerns, please don't hesitate to call.     Sincerely,          Tenisha Woodson MD

## 2020-09-08 NOTE — TELEPHONE ENCOUNTER
Let patient know that could not find the oncologist that saw her in [place]. That's OK, though. Will refer to [facility] oncologist who can see everything in the computer.

## 2020-09-09 ENCOUNTER — VIRTUAL VISIT (OUTPATIENT)
Dept: FAMILY MEDICINE CLINIC | Age: 80
End: 2020-09-09
Payer: MEDICARE

## 2020-09-09 ENCOUNTER — TELEPHONE (OUTPATIENT)
Dept: FAMILY MEDICINE CLINIC | Age: 80
End: 2020-09-09

## 2020-09-09 VITALS — HEIGHT: 65 IN | WEIGHT: 156.53 LBS | BODY MASS INDEX: 26.08 KG/M2

## 2020-09-09 PROCEDURE — G0438 PPPS, INITIAL VISIT: HCPCS | Performed by: FAMILY MEDICINE

## 2020-09-09 ASSESSMENT — PATIENT HEALTH QUESTIONNAIRE - PHQ9
SUM OF ALL RESPONSES TO PHQ QUESTIONS 1-9: 0
SUM OF ALL RESPONSES TO PHQ9 QUESTIONS 1 & 2: 0
2. FEELING DOWN, DEPRESSED OR HOPELESS: 0
1. LITTLE INTEREST OR PLEASURE IN DOING THINGS: 0
SUM OF ALL RESPONSES TO PHQ QUESTIONS 1-9: 0

## 2020-09-09 ASSESSMENT — LIFESTYLE VARIABLES: HOW OFTEN DO YOU HAVE A DRINK CONTAINING ALCOHOL: 0

## 2020-09-09 NOTE — PROGRESS NOTES
Medicare Annual Wellness Visit  Name: Herberth Valdivia Date: 2020   MRN: [id number] Sex: Female   Age: 79 y.o. Ethnicity: Non-/Non    : 1940 Race: Gregoria Arcos is here for Medicare AWV    Screenings for behavioral, psychosocial and functional/safety risks, and cognitive dysfunction are all negative except as indicated below. These results, as well as other patient data from the [address] form, are documented in Flowsheets linked to this Encounter. Allergies   Allergen Reactions    Cipro Xr Nausea Only    Imdur [Isosorbide Dinitrate]     Lipitor      Pt states she takes and tolerates RT, NP 18    Lisinopril Swelling     Lip swelling    Metformin And Related Other (See Comments)     Creatinine elevation    Norvasc [Amlodipine Besylate]     Roflumilast      Due to pancreattitis    Sulfamethoxazole-Trimethoprim Nausea Only    Zocor [Simvastatin]     Amlodipine Palpitations    Procardia Xl [Nifedipine] Palpitations         Prior to Visit Medications    Medication Sig Taking? Authorizing Provider   methocarbamol (ROBAXIN) 500 MG tablet Take 1 tablet by mouth 3 times daily Yes Martina Stewart MD   losartan (COZAAR) 100 MG tablet Take 1 tablet by mouth daily Yes Martina Stewart MD   predniSONE (DELTASONE) 10 MG tablet Take 1 tablet by mouth daily Yes Martina Stewart MD   gabapentin (NEURONTIN) 400 MG capsule Take 1 capsule by mouth 3 times daily for 90 doses.  Yes Martina Stewart MD   apixaban (ELIQUIS) 5 MG TABS tablet Take 1 tablet by mouth 2 times daily Yes Martina Stewart MD   labetalol (NORMODYNE) 200 MG tablet Take 0.5 tablets by mouth 2 times daily Yes Martina Stewart MD   aspirin 81 MG EC tablet Take 81 mg by mouth daily Yes Historical Provider, MD   umeclidinium-vilanterol (ANORO ELLIPTA) 62.5-25 MCG/INH AEPB inhaler 1 puff Yes Historical Provider, MD   azithromycin (ZITHROMAX) 250 MG tablet 1 tablet mon/wed/fri Yes Historical Provider, MD acetaminophen (TYLENOL) 500 MG tablet Take 500 mg by mouth every 12 hours as needed for Pain Yes Historical Provider, MD   insulin glargine (LANTUS) 100 UNIT/ML injection vial Inject into the skin nightly 7 units in the morning, 10 units at bedtime Yes Historical Provider, MD   sennosides-docusate sodium (SENOKOT-S) 8.6-50 MG tablet Take 1 tablet by mouth daily Yes Historical Provider, MD   atorvastatin (LIPITOR) 20 MG tablet TAKE 1 TABLET BY MOUTH ONCE DAILY Yes Louis Shannon MD   acarbose (PRECOSE) 50 MG tablet Take 1 tablet by mouth 3 times daily as needed (sugar over 200) Yes Louis Shannon MD   sertraline (ZOLOFT) 50 MG tablet Take 1 tablet by mouth daily Yes Louis Shannon MD   levothyroxine (SYNTHROID) 50 MCG tablet Take 1 tablet by mouth daily As directed Yes Louis Shannon MD   blood glucose test strips (ONE TOUCH ULTRA TEST) strip Twice daily Yes Louis Shannon MD   OXYGEN 2 L nightly Yes Britney Montalvo MD   albuterol sulfate HFA (PROAIR HFA) 108 (90 Base) MCG/ACT inhaler Take 2 puffs by mouth every 6 hours as needed Yes Britney Montalvo MD   albuterol (PROVENTIL) (2.5 MG/3ML) 0.083% nebulizer solution 3 mLs 4 times daily as needed  Yes Historical Provider, MD   magnesium (MAGNESIUM-OXIDE) 250 MG TABS tablet Take 250 mg by mouth daily Yes Historical Provider, MD   Omega 3 1000 MG CAPS Take 1 capsule by mouth daily  Yes Historical Provider, MD   Probiotic Product (PROBIOTIC COLON SUPPORT PO) Take 2 tablets by mouth daily Yes Historical Provider, MD   glucose monitoring kit (FREESTYLE) monitoring kit 1 kit by Does not apply route daily Dispense whatever brand is covered by insurance Yes Louis Shannon MD   Esaw Ng ELLIPTA 62.5-25 MCG/INH AEPB inhaler Inhale 1 puff into the lungs daily Yes Orcamila Richards   ipratropium-albuterol (DUONEB) 0.5-2.5 (3) MG/3ML SOLN nebulizer solution Inhale 1 vial into the lungs 3 times daily Yes Orvel Susie   Lancets MISC 1 each by Does not apply route 2 times daily for 180 doses  Osvaldo Robb MD         Past Medical History:   Diagnosis Date    Cataract     Chronic low back pain     Colon polyps     hyperplastic rectal polyp- dx'd in 2011-repeat colonoscopy in 2016    COPD (chronic obstructive pulmonary disease) (HCC)     COPD, severe ([facility] [address] 6/18/2015    Diverticulosis of colon 10/2011    moderately severe of descending colon and sigmoid colon    Hyperlipidemia     mixed    Hypertension     essential    Hypothyroidism dx'd 2006    acquired    Neuropathy     peripheral neuopathy    Osteoarthritis     affecting primarily the low back and hips; spinal stenosis    Osteopenia 6/15/2015    Peripheral vascular disease ([facility] [address]     dx'd in 2009    Raynaud's disease     Screening for cardiovascular condition 5/2008    Stress Test incomplete    Spinal stenosis     Type II or unspecified type diabetes mellitus without mention of complication, not stated as uncontrolled     dx'd in 3821; complications include peripheral  neuropathy       Past Surgical History:   Procedure Laterality Date    CARDIOVASCULAR STRESS TEST  11/2014    negative    CARDIOVASCULAR STRESS TEST  12/14/2017    COLONOSCOPY  10/20/2011    diverticulosis, rectal polyp,internal hemorrhoids; repeat colonoscopy in 2016   [address]  2004    TOE DEBRIDEMENT  8/2011    Right foot, fourth toe infection - S/P I & D - Dr Pravin Hawkins  08/24/2017         Family History   Problem Relation Age of Onset    Cancer Mother         Leukemia    Diabetes Father         Type 2    Diabetes Son        CareTeam (Including outside providers/suppliers regularly involved in providing care):   Patient Care Team:  Osvaldo Robb MD as PCP - Yoshi Sim MD as PCP - [facility] Empaneled Provider  Katie Barrera MD (Gastroenterology)  Shawn Shirley MD as Consulting Physician (Ophthalmology)  Naomi Fonseca MD as Surgeon (General Surgery)  Connie (Vascular Surgery)  Konstantin Anthony (Pulmonary Disease)    Wt Readings from Last 3 Encounters:   09/09/20 156 lb 8.4 oz (71 kg)   06/10/20 156 lb 9.6 oz (71 kg)   12/30/19 159 lb 12.8 oz (72.5 kg)     Vitals:    09/09/20 0903   Weight: 156 lb 8.4 oz (71 kg)   Height: 5' 5\" (1.651 m)     Body mass index is 26.05 kg/m². Based upon direct observation of the patient, evaluation of cognition reveals recent and remote memory intact. Patient's complete Health Risk Assessment and screening values have been reviewed and are found in Flowsheets. The following problems were reviewed today and where indicated follow up appointments were made and/or referrals ordered. Positive Risk Factor Screenings with Interventions:     Cognitive: Words recalled: 2 Words Recalled  Total Score Interpretation: Positive Mini-Cog  Did the patient refuse to take the cognition test?: No  Cognitive Impairment Interventions:  · Patient declines any further evaluation/treatment for cognitive impairment    Health Habits/Nutrition:  Health Habits/Nutrition  Do you exercise for at least 20 minutes 2-3 times per week?: Yes  Have you lost any weight without trying in the past 3 months?: No  Do you eat fewer than 2 meals per day?: No  Have you seen a dentist within the past year?: (!) No  Body mass index is 26.05 kg/m². Health Habits/Nutrition Interventions:  · Dental exam overdue:  patient encouraged to make appointment with his/her dentist    ADL:  ADLs  In the past 7 days, did you need help from others to perform any of the following everyday activities? Eating, dressing, grooming, bathing, toileting, or walking/balance?: None  In the past 7 days, did you need help from others to take care of any of the following?  Laundry, housekeeping, banking/finances, shopping, telephone use, food preparation, transportation, or taking medications?: [employer], Housekeeping, Shopping, Laundry, [employer]  ADL Act and the [address] waiver authority and the [employer] and [employer] Act, this Virtual Visit was conducted with patient's (and/or legal guardian's) consent, to reduce the patient's risk of exposure to COVID-19 and provide necessary medical care. The patient (and/or legal guardian) has also been advised to contact this office for worsening conditions or problems, and seek emergency medical treatment and/or call 911 if deemed necessary. Patient identification was verified at the start of the visit: Yes    Total time spent for this encounter: Not billed by time    Services were provided through a audio synchronous discussion virtually to substitute for in-person clinic visit. Patient and provider were located at their individual homes. --Lorraine Montes on 9/9/2020 at 9:05 AM    An electronic signature was used to authenticate this note. Deanna Galloway, 9/9/2020, performed the documented evaluation under the direct supervision of the attending physician.

## 2020-09-09 NOTE — PATIENT INSTRUCTIONS
Personalized Preventive Plan for Puneet Haq - 9/9/2020  Medicare offers a range of preventive health benefits. Some of the tests and screenings are paid in full while other may be subject to a deductible, co-insurance, and/or copay. Some of these benefits include a comprehensive review of your medical history including lifestyle, illnesses that may run in your family, and various assessments and screenings as appropriate. After reviewing your medical record and screening and assessments performed today your provider may have ordered immunizations, labs, imaging, and/or referrals for you. A list of these orders (if applicable) as well as your Preventive Care list are included within your After Visit Summary for your review. Other Preventive Recommendations:    · A preventive eye exam performed by an eye specialist is recommended every 1-2 years to screen for glaucoma; cataracts, macular degeneration, and other eye disorders. · A preventive dental visit is recommended every 6 months. · Try to get at least 150 minutes of exercise per week or 10,000 steps per day on a pedometer . · Order or download the FREE \"Exercise & Physical Activity: Your Everyday Guide\" from [employer]. Call 9-922.659.7315 or search [employer] online. · You need 6863-2782 mg of calcium and 8656-6299 IU of vitamin D per day. It is possible to meet your calcium requirement with diet alone, but a vitamin D supplement is usually necessary to meet this goal.  · When exposed to the sun, use a sunscreen that protects against both UVA and UVB radiation with an SPF of 30 or greater. Reapply every 2 to 3 hours or after sweating, drying off with a towel, or swimming. · Always wear a seat belt when traveling in a car. Always wear a helmet when riding a bicycle or motorcycle.

## 2020-09-25 ENCOUNTER — TELEPHONE (OUTPATIENT)
Dept: FAMILY MEDICINE CLINIC | Age: 80
End: 2020-09-25

## 2020-09-25 NOTE — TELEPHONE ENCOUNTER
Called patient's son, Kia Chase, to verify that she is in hospice. He states that she is. She is comfortable, but not really coherent. Expressed my sorrow for the family.     Plan: cancel all upcoming appts in Epic, please